# Patient Record
Sex: FEMALE | Race: WHITE | Employment: OTHER | URBAN - METROPOLITAN AREA
[De-identification: names, ages, dates, MRNs, and addresses within clinical notes are randomized per-mention and may not be internally consistent; named-entity substitution may affect disease eponyms.]

---

## 2017-04-04 PROBLEM — Z95.0 PACEMAKER: Status: ACTIVE | Noted: 2017-04-04

## 2017-08-14 PROBLEM — I48.0 PAROXYSMAL ATRIAL FIBRILLATION (HCC): Status: ACTIVE | Noted: 2017-08-14

## 2018-04-13 ENCOUNTER — HOSPITAL ENCOUNTER (EMERGENCY)
Age: 78
Discharge: HOME | End: 2018-04-13
Attending: EMERGENCY MEDICINE
Payer: MEDICARE

## 2018-04-13 VITALS
OXYGEN SATURATION: 99 % | RESPIRATION RATE: 16 BRPM | TEMPERATURE: 98.9 F | DIASTOLIC BLOOD PRESSURE: 80 MMHG | HEART RATE: 77 BPM | SYSTOLIC BLOOD PRESSURE: 133 MMHG

## 2018-04-13 DIAGNOSIS — N39.0 URINARY TRACT INFECTION ASSOCIATED WITH CYSTOSTOMY CATHETER, INITIAL ENCOUNTER (HCC): Primary | ICD-10-CM

## 2018-04-13 DIAGNOSIS — T83.010A SUPRAPUBIC CATHETER DYSFUNCTION, INITIAL ENCOUNTER (HCC): ICD-10-CM

## 2018-04-13 DIAGNOSIS — T83.510A URINARY TRACT INFECTION ASSOCIATED WITH CYSTOSTOMY CATHETER, INITIAL ENCOUNTER (HCC): Primary | ICD-10-CM

## 2018-04-13 LAB
BACTERIA: ABNORMAL /HPF
BILIRUBIN URINE: NEGATIVE
BLOOD, URINE: ABNORMAL
CLARITY: CLEAR
COLOR: YELLOW
GLUCOSE URINE: NEGATIVE MG/DL
KETONES, URINE: NEGATIVE MG/DL
LEUKOCYTE ESTERASE, URINE: ABNORMAL
NITRITE, URINE: POSITIVE
PH UA: 8 (ref 5–9)
PROTEIN UA: ABNORMAL MG/DL
RBC UA: ABNORMAL /HPF (ref 0–2)
SPECIFIC GRAVITY UA: 1.01 (ref 1–1.03)
UROBILINOGEN, URINE: 0.2 E.U./DL
WBC UA: ABNORMAL /HPF (ref 0–5)

## 2018-04-13 PROCEDURE — 81001 URINALYSIS AUTO W/SCOPE: CPT

## 2018-04-13 PROCEDURE — 99284 EMERGENCY DEPT VISIT MOD MDM: CPT

## 2018-04-13 RX ORDER — CEFDINIR 300 MG/1
300 CAPSULE ORAL 2 TIMES DAILY
Qty: 14 CAPSULE | Refills: 0 | Status: SHIPPED | OUTPATIENT
Start: 2018-04-13 | End: 2018-04-20

## 2018-04-13 ASSESSMENT — ENCOUNTER SYMPTOMS
WHEEZING: 0
RHINORRHEA: 0
COUGH: 0
SINUS PRESSURE: 0
EYE PAIN: 0
TROUBLE SWALLOWING: 0
CONSTIPATION: 0
NAUSEA: 0
SORE THROAT: 0
DIARRHEA: 0
PHOTOPHOBIA: 0
EYE REDNESS: 0
VOMITING: 0
ABDOMINAL PAIN: 0
BACK PAIN: 0
ABDOMINAL DISTENTION: 0
CHEST TIGHTNESS: 0
BLOOD IN STOOL: 0
SHORTNESS OF BREATH: 0

## 2018-04-23 ENCOUNTER — HOSPITAL ENCOUNTER (OUTPATIENT)
Dept: PREADMISSION TESTING | Age: 78
Discharge: HOME | End: 2018-04-23
Payer: MEDICARE

## 2018-04-23 ENCOUNTER — HOSPITAL ENCOUNTER (OUTPATIENT)
Dept: GENERAL RADIOLOGY | Age: 78
Discharge: HOME | End: 2018-04-25
Payer: MEDICARE

## 2018-04-23 ENCOUNTER — TELEPHONE (OUTPATIENT)
Dept: CARDIOLOGY CLINIC | Age: 78
End: 2018-04-23

## 2018-04-23 VITALS
HEART RATE: 63 BPM | TEMPERATURE: 98.1 F | OXYGEN SATURATION: 95 % | DIASTOLIC BLOOD PRESSURE: 60 MMHG | RESPIRATION RATE: 18 BRPM | SYSTOLIC BLOOD PRESSURE: 100 MMHG | WEIGHT: 284.38 LBS | BODY MASS INDEX: 45.7 KG/M2 | HEIGHT: 66 IN

## 2018-04-23 DIAGNOSIS — Z01.818 PRE-OP TESTING: Primary | ICD-10-CM

## 2018-04-23 LAB
ANION GAP SERPL CALCULATED.3IONS-SCNC: 10 MMOL/L (ref 7–16)
BUN BLDV-MCNC: 31 MG/DL (ref 8–23)
CALCIUM SERPL-MCNC: 8.7 MG/DL (ref 8.6–10.2)
CHLORIDE BLD-SCNC: 99 MMOL/L (ref 98–107)
CO2: 29 MMOL/L (ref 22–29)
CREAT SERPL-MCNC: 1.5 MG/DL (ref 0.5–1)
GFR AFRICAN AMERICAN: 41
GFR NON-AFRICAN AMERICAN: 33 ML/MIN/1.73
GLUCOSE BLD-MCNC: 317 MG/DL (ref 74–109)
HCT VFR BLD CALC: 32.5 % (ref 34–48)
HEMOGLOBIN: 10.6 G/DL (ref 11.5–15.5)
MCH RBC QN AUTO: 33.2 PG (ref 26–35)
MCHC RBC AUTO-ENTMCNC: 32.6 % (ref 32–34.5)
MCV RBC AUTO: 101.9 FL (ref 80–99.9)
PDW BLD-RTO: 14.9 FL (ref 11.5–15)
PLATELET # BLD: 187 E9/L (ref 130–450)
PMV BLD AUTO: 10 FL (ref 7–12)
POTASSIUM REFLEX MAGNESIUM: 4.2 MMOL/L (ref 3.5–5)
RBC # BLD: 3.19 E12/L (ref 3.5–5.5)
SODIUM BLD-SCNC: 138 MMOL/L (ref 132–146)
WBC # BLD: 6.1 E9/L (ref 4.5–11.5)

## 2018-04-23 PROCEDURE — 71046 X-RAY EXAM CHEST 2 VIEWS: CPT

## 2018-04-23 PROCEDURE — 80048 BASIC METABOLIC PNL TOTAL CA: CPT

## 2018-04-23 PROCEDURE — 85027 COMPLETE CBC AUTOMATED: CPT

## 2018-04-23 PROCEDURE — 36415 COLL VENOUS BLD VENIPUNCTURE: CPT

## 2018-04-23 PROCEDURE — 2580000003 HC RX 258: Performed by: ANESTHESIOLOGY

## 2018-04-23 PROCEDURE — 6360000002 HC RX W HCPCS: Performed by: ANESTHESIOLOGY

## 2018-04-23 RX ORDER — HEPARIN SODIUM (PORCINE) LOCK FLUSH IV SOLN 100 UNIT/ML 100 UNIT/ML
100 SOLUTION INTRAVENOUS PRN
Status: DISCONTINUED | OUTPATIENT
Start: 2018-04-23 | End: 2018-04-24 | Stop reason: HOSPADM

## 2018-04-23 RX ORDER — SODIUM CHLORIDE 0.9 % (FLUSH) 0.9 %
10 SYRINGE (ML) INJECTION 2 TIMES DAILY
Status: DISCONTINUED | OUTPATIENT
Start: 2018-04-23 | End: 2018-04-24 | Stop reason: HOSPADM

## 2018-04-23 RX ORDER — PANTOPRAZOLE SODIUM 40 MG/1
40 GRANULE, DELAYED RELEASE ORAL
COMMUNITY

## 2018-04-23 RX ORDER — BUDESONIDE AND FORMOTEROL FUMARATE DIHYDRATE 160; 4.5 UG/1; UG/1
2 AEROSOL RESPIRATORY (INHALATION) 2 TIMES DAILY
COMMUNITY

## 2018-04-23 RX ADMIN — HEPARIN 500 UNITS: 100 SYRINGE at 12:21

## 2018-04-23 RX ADMIN — Medication 10 ML: at 12:21

## 2018-04-23 ASSESSMENT — PAIN SCALES - GENERAL: PAINLEVEL_OUTOF10: 6

## 2018-04-23 ASSESSMENT — PAIN DESCRIPTION - PAIN TYPE: TYPE: CHRONIC PAIN

## 2018-04-24 ENCOUNTER — HOSPITAL ENCOUNTER (OUTPATIENT)
Dept: GENERAL RADIOLOGY | Age: 78
Discharge: HOME | End: 2018-04-26
Payer: MEDICARE

## 2018-04-24 ENCOUNTER — ANESTHESIA EVENT (OUTPATIENT)
Dept: OPERATING ROOM | Age: 78
End: 2018-04-24
Payer: MEDICARE

## 2018-04-24 ENCOUNTER — HOSPITAL ENCOUNTER (OUTPATIENT)
Age: 78
Setting detail: OUTPATIENT SURGERY
Discharge: HOME | End: 2018-04-24
Attending: UROLOGY | Admitting: UROLOGY
Payer: MEDICARE

## 2018-04-24 ENCOUNTER — ANESTHESIA (OUTPATIENT)
Dept: OPERATING ROOM | Age: 78
End: 2018-04-24
Payer: MEDICARE

## 2018-04-24 VITALS
SYSTOLIC BLOOD PRESSURE: 153 MMHG | RESPIRATION RATE: 14 BRPM | OXYGEN SATURATION: 98 % | DIASTOLIC BLOOD PRESSURE: 68 MMHG

## 2018-04-24 VITALS
HEIGHT: 66 IN | SYSTOLIC BLOOD PRESSURE: 154 MMHG | TEMPERATURE: 97.2 F | OXYGEN SATURATION: 100 % | WEIGHT: 284.38 LBS | DIASTOLIC BLOOD PRESSURE: 72 MMHG | BODY MASS INDEX: 45.7 KG/M2 | HEART RATE: 62 BPM | RESPIRATION RATE: 20 BRPM

## 2018-04-24 DIAGNOSIS — N32.81 OVERACTIVE BLADDER: ICD-10-CM

## 2018-04-24 DIAGNOSIS — N39.0 URINARY TRACT INFECTION WITHOUT HEMATURIA, SITE UNSPECIFIED: ICD-10-CM

## 2018-04-24 LAB — METER GLUCOSE: 187 MG/DL (ref 70–110)

## 2018-04-24 PROCEDURE — 6360000002 HC RX W HCPCS: Performed by: NURSE ANESTHETIST, CERTIFIED REGISTERED

## 2018-04-24 PROCEDURE — 7100000011 HC PHASE II RECOVERY - ADDTL 15 MIN: Performed by: UROLOGY

## 2018-04-24 PROCEDURE — 6360000002 HC RX W HCPCS: Performed by: ANESTHESIOLOGY

## 2018-04-24 PROCEDURE — 2580000003 HC RX 258: Performed by: UROLOGY

## 2018-04-24 PROCEDURE — 7100000010 HC PHASE II RECOVERY - FIRST 15 MIN: Performed by: UROLOGY

## 2018-04-24 PROCEDURE — 82962 GLUCOSE BLOOD TEST: CPT

## 2018-04-24 PROCEDURE — 3700000000 HC ANESTHESIA ATTENDED CARE: Performed by: UROLOGY

## 2018-04-24 PROCEDURE — 74241 FL UGI W KUB: CPT

## 2018-04-24 PROCEDURE — 6360000002 HC RX W HCPCS: Performed by: UROLOGY

## 2018-04-24 PROCEDURE — 3600000002 HC SURGERY LEVEL 2 BASE: Performed by: UROLOGY

## 2018-04-24 PROCEDURE — 3700000001 HC ADD 15 MINUTES (ANESTHESIA): Performed by: UROLOGY

## 2018-04-24 PROCEDURE — 3600000012 HC SURGERY LEVEL 2 ADDTL 15MIN: Performed by: UROLOGY

## 2018-04-24 RX ORDER — FENTANYL CITRATE 50 UG/ML
INJECTION, SOLUTION INTRAMUSCULAR; INTRAVENOUS PRN
Status: DISCONTINUED | OUTPATIENT
Start: 2018-04-24 | End: 2018-04-24 | Stop reason: SDUPTHER

## 2018-04-24 RX ORDER — SODIUM CHLORIDE 0.9 % (FLUSH) 0.9 %
10 SYRINGE (ML) INJECTION EVERY 12 HOURS SCHEDULED
Status: DISCONTINUED | OUTPATIENT
Start: 2018-04-24 | End: 2018-04-24 | Stop reason: HOSPADM

## 2018-04-24 RX ORDER — SODIUM CHLORIDE 0.9 % (FLUSH) 0.9 %
10 SYRINGE (ML) INJECTION PRN
Status: DISCONTINUED | OUTPATIENT
Start: 2018-04-24 | End: 2018-04-24 | Stop reason: HOSPADM

## 2018-04-24 RX ORDER — ACETAMINOPHEN 325 MG/1
650 TABLET ORAL EVERY 6 HOURS PRN
Status: DISCONTINUED | OUTPATIENT
Start: 2018-04-24 | End: 2018-04-24 | Stop reason: HOSPADM

## 2018-04-24 RX ORDER — SODIUM CHLORIDE 9 MG/ML
INJECTION, SOLUTION INTRAVENOUS CONTINUOUS
Status: DISCONTINUED | OUTPATIENT
Start: 2018-04-24 | End: 2018-04-24 | Stop reason: HOSPADM

## 2018-04-24 RX ORDER — CIPROFLOXACIN 250 MG/1
250 TABLET, FILM COATED ORAL 2 TIMES DAILY
Qty: 6 TABLET | Refills: 0 | Status: SHIPPED | OUTPATIENT
Start: 2018-04-24 | End: 2018-04-27

## 2018-04-24 RX ORDER — ONDANSETRON 2 MG/ML
4 INJECTION INTRAMUSCULAR; INTRAVENOUS EVERY 6 HOURS PRN
Status: DISCONTINUED | OUTPATIENT
Start: 2018-04-24 | End: 2018-04-24 | Stop reason: HOSPADM

## 2018-04-24 RX ORDER — PROPOFOL 10 MG/ML
INJECTION, EMULSION INTRAVENOUS CONTINUOUS PRN
Status: DISCONTINUED | OUTPATIENT
Start: 2018-04-24 | End: 2018-04-24 | Stop reason: SDUPTHER

## 2018-04-24 RX ORDER — MEPERIDINE HYDROCHLORIDE 25 MG/ML
12.5 INJECTION INTRAMUSCULAR; INTRAVENOUS; SUBCUTANEOUS EVERY 5 MIN PRN
Status: DISCONTINUED | OUTPATIENT
Start: 2018-04-24 | End: 2018-04-24 | Stop reason: HOSPADM

## 2018-04-24 RX ORDER — CIPROFLOXACIN 2 MG/ML
400 INJECTION, SOLUTION INTRAVENOUS
Status: COMPLETED | OUTPATIENT
Start: 2018-04-24 | End: 2018-04-24

## 2018-04-24 RX ORDER — HEPARIN SODIUM (PORCINE) LOCK FLUSH IV SOLN 100 UNIT/ML 100 UNIT/ML
100 SOLUTION INTRAVENOUS PRN
Status: DISCONTINUED | OUTPATIENT
Start: 2018-04-24 | End: 2018-04-24 | Stop reason: HOSPADM

## 2018-04-24 RX ADMIN — PROPOFOL 50 MCG/KG/MIN: 10 INJECTION, EMULSION INTRAVENOUS at 09:28

## 2018-04-24 RX ADMIN — Medication 10 ML: at 08:41

## 2018-04-24 RX ADMIN — CIPROFLOXACIN 400 MG: 2 INJECTION, SOLUTION INTRAVENOUS at 09:22

## 2018-04-24 RX ADMIN — SODIUM CHLORIDE: 9 INJECTION, SOLUTION INTRAVENOUS at 08:41

## 2018-04-24 RX ADMIN — SODIUM CHLORIDE: 9 INJECTION, SOLUTION INTRAVENOUS at 09:22

## 2018-04-24 RX ADMIN — HEPARIN 500 UNITS: 100 SYRINGE at 11:29

## 2018-04-24 RX ADMIN — FENTANYL CITRATE 25 MCG: 50 INJECTION, SOLUTION INTRAMUSCULAR; INTRAVENOUS at 09:46

## 2018-04-24 RX ADMIN — FENTANYL CITRATE 25 MCG: 50 INJECTION, SOLUTION INTRAMUSCULAR; INTRAVENOUS at 09:54

## 2018-04-24 RX ADMIN — FENTANYL CITRATE 25 MCG: 50 INJECTION, SOLUTION INTRAMUSCULAR; INTRAVENOUS at 09:42

## 2018-04-24 RX ADMIN — FENTANYL CITRATE 25 MCG: 50 INJECTION, SOLUTION INTRAMUSCULAR; INTRAVENOUS at 09:28

## 2018-04-24 RX ADMIN — Medication 10 ML: at 11:26

## 2018-04-24 ASSESSMENT — PULMONARY FUNCTION TESTS
PIF_VALUE: 1
PIF_VALUE: 0
PIF_VALUE: 1
PIF_VALUE: 0
PIF_VALUE: 0
PIF_VALUE: 1
PIF_VALUE: 0
PIF_VALUE: 1

## 2018-04-24 ASSESSMENT — PAIN DESCRIPTION - PAIN TYPE: TYPE: ACUTE PAIN

## 2018-04-24 ASSESSMENT — PAIN SCALES - GENERAL
PAINLEVEL_OUTOF10: 0
PAINLEVEL_OUTOF10: 0

## 2018-04-24 ASSESSMENT — PAIN - FUNCTIONAL ASSESSMENT: PAIN_FUNCTIONAL_ASSESSMENT: 0-10

## 2018-04-24 ASSESSMENT — PAIN DESCRIPTION - DESCRIPTORS: DESCRIPTORS: ACHING

## 2018-07-09 ENCOUNTER — HOSPITAL ENCOUNTER (OUTPATIENT)
Dept: SLEEP CENTER | Age: 78
Discharge: HOME | End: 2018-07-09
Payer: MEDICARE

## 2018-07-31 ENCOUNTER — TELEPHONE (OUTPATIENT)
Dept: CARDIOLOGY CLINIC | Age: 78
End: 2018-07-31

## 2018-07-31 NOTE — TELEPHONE ENCOUNTER
Called patient to schedule 6 month appointment off of August Recall. Her Daughter informed me that she is seeing Dr. Nataliia Metz now.

## 2018-08-25 ENCOUNTER — HOSPITAL ENCOUNTER (OUTPATIENT)
Dept: SLEEP CENTER | Age: 78
Discharge: HOME | End: 2018-08-25
Payer: MEDICARE

## 2018-08-25 PROCEDURE — 95811 POLYSOM 6/>YRS CPAP 4/> PARM: CPT

## 2018-08-30 ENCOUNTER — HOSPITAL ENCOUNTER (INPATIENT)
Age: 78
LOS: 2 days | Discharge: HOME | DRG: 064 | End: 2018-09-01
Attending: INTERNAL MEDICINE | Admitting: INTERNAL MEDICINE
Payer: MEDICARE

## 2018-08-30 ENCOUNTER — APPOINTMENT (OUTPATIENT)
Dept: CT IMAGING | Age: 78
DRG: 064 | End: 2018-08-30
Attending: INTERNAL MEDICINE
Payer: MEDICARE

## 2018-08-30 PROBLEM — Z91.89 STROKE RISK: Status: ACTIVE | Noted: 2018-08-30

## 2018-08-30 PROBLEM — R29.898 LUE WEAKNESS: Status: ACTIVE | Noted: 2018-08-30

## 2018-08-30 PROBLEM — I63.9 ACUTE CEREBROVASCULAR ACCIDENT (CVA) (HCC): Status: ACTIVE | Noted: 2018-08-30

## 2018-08-30 LAB
ANION GAP SERPL CALCULATED.3IONS-SCNC: 12 MMOL/L (ref 7–16)
B.E.: 5 MMOL/L (ref -3–0)
BASOPHILS ABSOLUTE: 0.05 E9/L (ref 0–0.2)
BASOPHILS RELATIVE PERCENT: 0.5 % (ref 0–2)
BUN BLDV-MCNC: 52 MG/DL (ref 8–23)
CALCIUM SERPL-MCNC: 8.8 MG/DL (ref 8.6–10.2)
CARDIOPULMONARY BYPASS: NO
CHLORIDE BLD-SCNC: 102 MMOL/L (ref 98–107)
CO2: 29 MMOL/L (ref 22–29)
CREAT SERPL-MCNC: 2.1 MG/DL (ref 0.5–1)
DELIVERY SYSTEMS: ABNORMAL
DEVICE: ABNORMAL
EKG ATRIAL RATE: 468 BPM
EKG P-R INTERVAL: 338 MS
EKG Q-T INTERVAL: 352 MS
EKG QRS DURATION: 90 MS
EKG QTC CALCULATION (BAZETT): 352 MS
EKG R AXIS: -2 DEGREES
EKG T AXIS: -12 DEGREES
EKG VENTRICULAR RATE: 60 BPM
EOSINOPHILS ABSOLUTE: 0.09 E9/L (ref 0.05–0.5)
EOSINOPHILS RELATIVE PERCENT: 0.9 % (ref 0–6)
FIO2 ARTERIAL: 6
GFR AFRICAN AMERICAN: 27
GFR NON-AFRICAN AMERICAN: 23 ML/MIN/1.73
GLUCOSE BLD-MCNC: 178 MG/DL (ref 74–109)
HCO3 ARTERIAL: 30.8 MMOL/L (ref 22–26)
HCT VFR BLD CALC: 30.3 % (ref 34–48)
HEMOGLOBIN: 9.7 G/DL (ref 11.5–15.5)
IMMATURE GRANULOCYTES #: 0.05 E9/L
IMMATURE GRANULOCYTES %: 0.5 % (ref 0–5)
LYMPHOCYTES ABSOLUTE: 0.65 E9/L (ref 1.5–4)
LYMPHOCYTES RELATIVE PERCENT: 6.5 % (ref 20–42)
MCH RBC QN AUTO: 32.3 PG (ref 26–35)
MCHC RBC AUTO-ENTMCNC: 32 % (ref 32–34.5)
MCV RBC AUTO: 101 FL (ref 80–99.9)
METER GLUCOSE: 180 MG/DL (ref 70–110)
MONOCYTES ABSOLUTE: 0.52 E9/L (ref 0.1–0.95)
MONOCYTES RELATIVE PERCENT: 5.2 % (ref 2–12)
NEUTROPHILS ABSOLUTE: 8.66 E9/L (ref 1.8–7.3)
NEUTROPHILS RELATIVE PERCENT: 86.4 % (ref 43–80)
O2 SATURATION: 96.8 % (ref 92–98.5)
OPERATOR ID: 1394
PATIENT TEMP: 37
PCO2 (TEMP CORRECTED): 50.2 MMHG (ref 35–45)
PDW BLD-RTO: 14.6 FL (ref 11.5–15)
PH (TEMPERATURE CORRECTED): 7.4 (ref 7.35–7.45)
PLATELET # BLD: 164 E9/L (ref 130–450)
PMV BLD AUTO: 10.3 FL (ref 7–12)
PO2 (TEMP CORRECTED): 90.9 MMHG (ref 60–80)
POTASSIUM SERPL-SCNC: 3 MMOL/L (ref 3.5–5)
RBC # BLD: 3 E12/L (ref 3.5–5.5)
SODIUM BLD-SCNC: 143 MMOL/L (ref 132–146)
SOURCE, BLOOD GAS: ABNORMAL
WBC # BLD: 10 E9/L (ref 4.5–11.5)

## 2018-08-30 PROCEDURE — 2580000003 HC RX 258

## 2018-08-30 PROCEDURE — 82803 BLOOD GASES ANY COMBINATION: CPT

## 2018-08-30 PROCEDURE — 80048 BASIC METABOLIC PNL TOTAL CA: CPT

## 2018-08-30 PROCEDURE — 87081 CULTURE SCREEN ONLY: CPT

## 2018-08-30 PROCEDURE — 36415 COLL VENOUS BLD VENIPUNCTURE: CPT

## 2018-08-30 PROCEDURE — 85025 COMPLETE CBC W/AUTO DIFF WBC: CPT

## 2018-08-30 PROCEDURE — 70450 CT HEAD/BRAIN W/O DYE: CPT

## 2018-08-30 PROCEDURE — 2000000000 HC ICU R&B

## 2018-08-30 PROCEDURE — 82962 GLUCOSE BLOOD TEST: CPT

## 2018-08-30 PROCEDURE — 93005 ELECTROCARDIOGRAM TRACING: CPT | Performed by: INTERNAL MEDICINE

## 2018-08-30 PROCEDURE — 2580000003 HC RX 258: Performed by: HOSPITALIST

## 2018-08-30 PROCEDURE — 94660 CPAP INITIATION&MGMT: CPT

## 2018-08-30 RX ORDER — FUROSEMIDE 40 MG/1
40 TABLET ORAL 2 TIMES DAILY
Status: DISCONTINUED | OUTPATIENT
Start: 2018-08-30 | End: 2018-08-30

## 2018-08-30 RX ORDER — ARIPIPRAZOLE 5 MG/1
5 TABLET ORAL DAILY
Status: DISCONTINUED | OUTPATIENT
Start: 2018-08-31 | End: 2018-09-01 | Stop reason: HOSPADM

## 2018-08-30 RX ORDER — IPRATROPIUM BROMIDE AND ALBUTEROL SULFATE 2.5; .5 MG/3ML; MG/3ML
1 SOLUTION RESPIRATORY (INHALATION)
Status: DISCONTINUED | OUTPATIENT
Start: 2018-08-31 | End: 2018-09-01 | Stop reason: HOSPADM

## 2018-08-30 RX ORDER — DEXTROSE MONOHYDRATE 50 MG/ML
100 INJECTION, SOLUTION INTRAVENOUS PRN
Status: DISCONTINUED | OUTPATIENT
Start: 2018-08-30 | End: 2018-09-01 | Stop reason: HOSPADM

## 2018-08-30 RX ORDER — HYDROCODONE BITARTRATE AND ACETAMINOPHEN 7.5; 325 MG/1; MG/1
1 TABLET ORAL EVERY 6 HOURS PRN
Status: DISCONTINUED | OUTPATIENT
Start: 2018-08-30 | End: 2018-08-30 | Stop reason: SDUPTHER

## 2018-08-30 RX ORDER — FOLIC ACID 1 MG/1
1 TABLET ORAL DAILY
Status: DISCONTINUED | OUTPATIENT
Start: 2018-08-31 | End: 2018-09-01 | Stop reason: HOSPADM

## 2018-08-30 RX ORDER — POTASSIUM CHLORIDE 20 MEQ/1
40 TABLET, EXTENDED RELEASE ORAL ONCE
Status: COMPLETED | OUTPATIENT
Start: 2018-08-30 | End: 2018-08-31

## 2018-08-30 RX ORDER — TRAMADOL HYDROCHLORIDE 50 MG/1
50 TABLET ORAL EVERY 6 HOURS PRN
Status: DISCONTINUED | OUTPATIENT
Start: 2018-08-30 | End: 2018-09-01 | Stop reason: HOSPADM

## 2018-08-30 RX ORDER — BUSPIRONE HYDROCHLORIDE 10 MG/1
10 TABLET ORAL 2 TIMES DAILY
Status: DISCONTINUED | OUTPATIENT
Start: 2018-08-30 | End: 2018-09-01 | Stop reason: HOSPADM

## 2018-08-30 RX ORDER — PANTOPRAZOLE SODIUM 40 MG/1
40 TABLET, DELAYED RELEASE ORAL
Status: DISCONTINUED | OUTPATIENT
Start: 2018-08-31 | End: 2018-09-01 | Stop reason: HOSPADM

## 2018-08-30 RX ORDER — ATORVASTATIN CALCIUM 40 MG/1
40 TABLET, FILM COATED ORAL NIGHTLY
Status: DISCONTINUED | OUTPATIENT
Start: 2018-08-30 | End: 2018-09-01 | Stop reason: HOSPADM

## 2018-08-30 RX ORDER — ALLOPURINOL 100 MG/1
100 TABLET ORAL DAILY
Status: DISCONTINUED | OUTPATIENT
Start: 2018-08-31 | End: 2018-09-01 | Stop reason: HOSPADM

## 2018-08-30 RX ORDER — FERROUS SULFATE 325(65) MG
325 TABLET ORAL
Status: DISCONTINUED | OUTPATIENT
Start: 2018-08-31 | End: 2018-09-01 | Stop reason: HOSPADM

## 2018-08-30 RX ORDER — LOSARTAN POTASSIUM 25 MG/1
25 TABLET ORAL DAILY
Status: DISCONTINUED | OUTPATIENT
Start: 2018-08-31 | End: 2018-09-01 | Stop reason: HOSPADM

## 2018-08-30 RX ORDER — DESVENLAFAXINE 50 MG/1
100 TABLET, EXTENDED RELEASE ORAL DAILY
Status: DISCONTINUED | OUTPATIENT
Start: 2018-08-31 | End: 2018-08-30 | Stop reason: SDUPTHER

## 2018-08-30 RX ORDER — SODIUM CHLORIDE 0.9 % (FLUSH) 0.9 %
10 SYRINGE (ML) INJECTION PRN
Status: DISCONTINUED | OUTPATIENT
Start: 2018-08-30 | End: 2018-09-01 | Stop reason: HOSPADM

## 2018-08-30 RX ORDER — HYDROCODONE BITARTRATE AND ACETAMINOPHEN 7.5; 325 MG/1; MG/1
1 TABLET ORAL EVERY 6 HOURS PRN
Status: DISCONTINUED | OUTPATIENT
Start: 2018-08-30 | End: 2018-09-01 | Stop reason: HOSPADM

## 2018-08-30 RX ORDER — SODIUM CHLORIDE 0.9 % (FLUSH) 0.9 %
10 SYRINGE (ML) INJECTION EVERY 12 HOURS SCHEDULED
Status: DISCONTINUED | OUTPATIENT
Start: 2018-08-30 | End: 2018-09-01 | Stop reason: HOSPADM

## 2018-08-30 RX ORDER — PRIMIDONE 50 MG/1
125 TABLET ORAL 2 TIMES DAILY
Status: DISCONTINUED | OUTPATIENT
Start: 2018-08-31 | End: 2018-08-30

## 2018-08-30 RX ORDER — SPIRONOLACTONE 25 MG/1
12.5 TABLET ORAL DAILY
Status: DISCONTINUED | OUTPATIENT
Start: 2018-08-31 | End: 2018-08-30

## 2018-08-30 RX ORDER — POTASSIUM CHLORIDE 750 MG/1
60 CAPSULE, EXTENDED RELEASE ORAL ONCE
Status: DISCONTINUED | OUTPATIENT
Start: 2018-08-30 | End: 2018-08-30

## 2018-08-30 RX ORDER — TOPIRAMATE 25 MG/1
25 TABLET ORAL 2 TIMES DAILY
Status: DISCONTINUED | OUTPATIENT
Start: 2018-08-30 | End: 2018-09-01 | Stop reason: HOSPADM

## 2018-08-30 RX ORDER — ONDANSETRON 2 MG/ML
4 INJECTION INTRAMUSCULAR; INTRAVENOUS EVERY 6 HOURS PRN
Status: DISCONTINUED | OUTPATIENT
Start: 2018-08-30 | End: 2018-09-01 | Stop reason: HOSPADM

## 2018-08-30 RX ORDER — NICOTINE POLACRILEX 4 MG
15 LOZENGE BUCCAL PRN
Status: DISCONTINUED | OUTPATIENT
Start: 2018-08-30 | End: 2018-09-01 | Stop reason: HOSPADM

## 2018-08-30 RX ORDER — DEXTROSE MONOHYDRATE 25 G/50ML
12.5 INJECTION, SOLUTION INTRAVENOUS PRN
Status: DISCONTINUED | OUTPATIENT
Start: 2018-08-30 | End: 2018-09-01 | Stop reason: HOSPADM

## 2018-08-30 RX ORDER — METOPROLOL SUCCINATE 25 MG/1
25 TABLET, EXTENDED RELEASE ORAL 2 TIMES DAILY
Status: DISCONTINUED | OUTPATIENT
Start: 2018-08-31 | End: 2018-09-01 | Stop reason: HOSPADM

## 2018-08-30 RX ORDER — TIZANIDINE 4 MG/1
4 TABLET ORAL EVERY 6 HOURS PRN
Status: DISCONTINUED | OUTPATIENT
Start: 2018-08-30 | End: 2018-09-01 | Stop reason: HOSPADM

## 2018-08-30 RX ORDER — ISOSORBIDE MONONITRATE 30 MG/1
30 TABLET, EXTENDED RELEASE ORAL DAILY
Status: DISCONTINUED | OUTPATIENT
Start: 2018-08-31 | End: 2018-09-01 | Stop reason: HOSPADM

## 2018-08-30 RX ADMIN — DEXTROSE MONOHYDRATE 12.5 G: 25 INJECTION, SOLUTION INTRAVENOUS at 23:56

## 2018-08-30 ASSESSMENT — PAIN SCALES - GENERAL: PAINLEVEL_OUTOF10: 0

## 2018-08-31 ENCOUNTER — APPOINTMENT (OUTPATIENT)
Dept: ULTRASOUND IMAGING | Age: 78
DRG: 064 | End: 2018-08-31
Attending: INTERNAL MEDICINE
Payer: MEDICARE

## 2018-08-31 LAB
ANION GAP SERPL CALCULATED.3IONS-SCNC: 15 MMOL/L (ref 7–16)
BUN BLDV-MCNC: 49 MG/DL (ref 8–23)
CALCIUM SERPL-MCNC: 8.7 MG/DL (ref 8.6–10.2)
CHLORIDE BLD-SCNC: 101 MMOL/L (ref 98–107)
CO2: 29 MMOL/L (ref 22–29)
CREAT SERPL-MCNC: 2 MG/DL (ref 0.5–1)
GFR AFRICAN AMERICAN: 29
GFR NON-AFRICAN AMERICAN: 24 ML/MIN/1.73
GLUCOSE BLD-MCNC: 112 MG/DL (ref 74–109)
HCT VFR BLD CALC: 30.7 % (ref 34–48)
HEMOGLOBIN: 9.7 G/DL (ref 11.5–15.5)
MAGNESIUM: 2.5 MG/DL (ref 1.6–2.6)
MCH RBC QN AUTO: 32.6 PG (ref 26–35)
MCHC RBC AUTO-ENTMCNC: 31.6 % (ref 32–34.5)
MCV RBC AUTO: 103 FL (ref 80–99.9)
METER GLUCOSE: 177 MG/DL (ref 70–110)
METER GLUCOSE: 196 MG/DL (ref 70–110)
METER GLUCOSE: 68 MG/DL (ref 70–110)
METER GLUCOSE: 88 MG/DL (ref 70–110)
METER GLUCOSE: 97 MG/DL (ref 70–110)
METER GLUCOSE: 99 MG/DL (ref 70–110)
PDW BLD-RTO: 14.9 FL (ref 11.5–15)
PLATELET # BLD: 169 E9/L (ref 130–450)
PMV BLD AUTO: 10.3 FL (ref 7–12)
POTASSIUM REFLEX MAGNESIUM: 4 MMOL/L (ref 3.5–5)
RBC # BLD: 2.98 E12/L (ref 3.5–5.5)
SODIUM BLD-SCNC: 145 MMOL/L (ref 132–146)
WBC # BLD: 8.2 E9/L (ref 4.5–11.5)

## 2018-08-31 PROCEDURE — 97530 THERAPEUTIC ACTIVITIES: CPT

## 2018-08-31 PROCEDURE — 80048 BASIC METABOLIC PNL TOTAL CA: CPT

## 2018-08-31 PROCEDURE — 6370000000 HC RX 637 (ALT 250 FOR IP): Performed by: HOSPITALIST

## 2018-08-31 PROCEDURE — 2580000003 HC RX 258: Performed by: HOSPITALIST

## 2018-08-31 PROCEDURE — 6360000002 HC RX W HCPCS: Performed by: INTERNAL MEDICINE

## 2018-08-31 PROCEDURE — 94660 CPAP INITIATION&MGMT: CPT

## 2018-08-31 PROCEDURE — 97162 PT EVAL MOD COMPLEX 30 MIN: CPT

## 2018-08-31 PROCEDURE — 82962 GLUCOSE BLOOD TEST: CPT

## 2018-08-31 PROCEDURE — 99221 1ST HOSP IP/OBS SF/LOW 40: CPT | Performed by: PSYCHIATRY & NEUROLOGY

## 2018-08-31 PROCEDURE — 85027 COMPLETE CBC AUTOMATED: CPT

## 2018-08-31 PROCEDURE — 94640 AIRWAY INHALATION TREATMENT: CPT

## 2018-08-31 PROCEDURE — 94664 DEMO&/EVAL PT USE INHALER: CPT

## 2018-08-31 PROCEDURE — 92523 SPEECH SOUND LANG COMPREHEN: CPT

## 2018-08-31 PROCEDURE — APPSS30 APP SPLIT SHARED TIME 16-30 MINUTES: Performed by: NURSE PRACTITIONER

## 2018-08-31 PROCEDURE — 2060000000 HC ICU INTERMEDIATE R&B

## 2018-08-31 PROCEDURE — G8981 BODY POS CURRENT STATUS: HCPCS

## 2018-08-31 PROCEDURE — 93010 ELECTROCARDIOGRAM REPORT: CPT | Performed by: INTERNAL MEDICINE

## 2018-08-31 PROCEDURE — 97166 OT EVAL MOD COMPLEX 45 MIN: CPT

## 2018-08-31 PROCEDURE — 83735 ASSAY OF MAGNESIUM: CPT

## 2018-08-31 PROCEDURE — 6370000000 HC RX 637 (ALT 250 FOR IP): Performed by: INTERNAL MEDICINE

## 2018-08-31 PROCEDURE — 93880 EXTRACRANIAL BILAT STUDY: CPT

## 2018-08-31 PROCEDURE — 6370000000 HC RX 637 (ALT 250 FOR IP): Performed by: PSYCHIATRY & NEUROLOGY

## 2018-08-31 PROCEDURE — G8982 BODY POS GOAL STATUS: HCPCS

## 2018-08-31 PROCEDURE — 2700000000 HC OXYGEN THERAPY PER DAY

## 2018-08-31 PROCEDURE — 36415 COLL VENOUS BLD VENIPUNCTURE: CPT

## 2018-08-31 RX ORDER — INSULIN GLARGINE 100 [IU]/ML
75 INJECTION, SOLUTION SUBCUTANEOUS DAILY
Status: DISCONTINUED | OUTPATIENT
Start: 2018-09-01 | End: 2018-09-01 | Stop reason: HOSPADM

## 2018-08-31 RX ORDER — DEXTROSE AND SODIUM CHLORIDE 5; .9 G/100ML; G/100ML
INJECTION, SOLUTION INTRAVENOUS CONTINUOUS
Status: DISCONTINUED | OUTPATIENT
Start: 2018-08-31 | End: 2018-08-31

## 2018-08-31 RX ORDER — INSULIN GLARGINE 100 [IU]/ML
35 INJECTION, SOLUTION SUBCUTANEOUS NIGHTLY
Status: DISCONTINUED | OUTPATIENT
Start: 2018-08-31 | End: 2018-09-01 | Stop reason: HOSPADM

## 2018-08-31 RX ORDER — POTASSIUM CHLORIDE 20 MEQ/1
TABLET, EXTENDED RELEASE ORAL
Status: DISPENSED
Start: 2018-08-31 | End: 2018-08-31

## 2018-08-31 RX ORDER — NITROGLYCERIN 0.4 MG/1
0.4 TABLET SUBLINGUAL EVERY 5 MIN PRN
Status: DISCONTINUED | OUTPATIENT
Start: 2018-08-31 | End: 2018-09-01 | Stop reason: HOSPADM

## 2018-08-31 RX ORDER — CLOPIDOGREL BISULFATE 75 MG/1
75 TABLET ORAL DAILY
Status: DISCONTINUED | OUTPATIENT
Start: 2018-08-31 | End: 2018-09-01 | Stop reason: HOSPADM

## 2018-08-31 RX ORDER — LEVOTHYROXINE SODIUM 0.15 MG/1
150 TABLET ORAL
Status: DISCONTINUED | OUTPATIENT
Start: 2018-09-01 | End: 2018-09-01 | Stop reason: HOSPADM

## 2018-08-31 RX ORDER — LANOLIN ALCOHOL/MO/W.PET/CERES
400 CREAM (GRAM) TOPICAL DAILY
Status: DISCONTINUED | OUTPATIENT
Start: 2018-09-01 | End: 2018-09-01 | Stop reason: HOSPADM

## 2018-08-31 RX ORDER — LIDOCAINE 50 MG/G
OINTMENT TOPICAL 2 TIMES DAILY
Status: DISCONTINUED | OUTPATIENT
Start: 2018-08-31 | End: 2018-09-01 | Stop reason: HOSPADM

## 2018-08-31 RX ADMIN — BUSPIRONE HYDROCHLORIDE 10 MG: 10 TABLET ORAL at 21:37

## 2018-08-31 RX ADMIN — ARIPIPRAZOLE 5 MG: 5 TABLET ORAL at 10:45

## 2018-08-31 RX ADMIN — PANTOPRAZOLE SODIUM 40 MG: 40 TABLET, DELAYED RELEASE ORAL at 10:46

## 2018-08-31 RX ADMIN — FERROUS SULFATE TAB 325 MG (65 MG ELEMENTAL FE) 325 MG: 325 (65 FE) TAB at 10:46

## 2018-08-31 RX ADMIN — LIDOCAINE: 50 OINTMENT TOPICAL at 21:36

## 2018-08-31 RX ADMIN — CLOPIDOGREL 75 MG: 75 TABLET, FILM COATED ORAL at 13:50

## 2018-08-31 RX ADMIN — FOLIC ACID 1 MG: 1 TABLET ORAL at 10:45

## 2018-08-31 RX ADMIN — IPRATROPIUM BROMIDE AND ALBUTEROL SULFATE 1 AMPULE: .5; 3 SOLUTION RESPIRATORY (INHALATION) at 20:59

## 2018-08-31 RX ADMIN — INSULIN LISPRO 2 UNITS: 100 INJECTION, SOLUTION INTRAVENOUS; SUBCUTANEOUS at 21:42

## 2018-08-31 RX ADMIN — APIXABAN 5 MG: 5 TABLET, FILM COATED ORAL at 21:37

## 2018-08-31 RX ADMIN — INSULIN GLARGINE 35 UNITS: 100 INJECTION, SOLUTION SUBCUTANEOUS at 21:42

## 2018-08-31 RX ADMIN — DEXTROSE AND SODIUM CHLORIDE: 5; 900 INJECTION, SOLUTION INTRAVENOUS at 01:53

## 2018-08-31 RX ADMIN — LOSARTAN POTASSIUM 25 MG: 25 TABLET, FILM COATED ORAL at 10:47

## 2018-08-31 RX ADMIN — Medication 10 ML: at 21:37

## 2018-08-31 RX ADMIN — ISOSORBIDE MONONITRATE 30 MG: 30 TABLET ORAL at 10:44

## 2018-08-31 RX ADMIN — METOPROLOL SUCCINATE 25 MG: 25 TABLET, FILM COATED, EXTENDED RELEASE ORAL at 10:46

## 2018-08-31 RX ADMIN — IPRATROPIUM BROMIDE AND ALBUTEROL SULFATE 1 AMPULE: .5; 3 SOLUTION RESPIRATORY (INHALATION) at 09:18

## 2018-08-31 RX ADMIN — METOPROLOL SUCCINATE 25 MG: 25 TABLET, FILM COATED, EXTENDED RELEASE ORAL at 18:45

## 2018-08-31 RX ADMIN — TOPIRAMATE 25 MG: 25 TABLET, FILM COATED ORAL at 21:37

## 2018-08-31 RX ADMIN — MOMETASONE FUROATE AND FORMOTEROL FUMARATE DIHYDRATE 2 PUFF: 200; 5 AEROSOL RESPIRATORY (INHALATION) at 10:48

## 2018-08-31 RX ADMIN — POTASSIUM CHLORIDE 40 MEQ: 20 TABLET, EXTENDED RELEASE ORAL at 01:42

## 2018-08-31 RX ADMIN — MAGNESIUM HYDROXIDE 30 ML: 400 SUSPENSION ORAL at 18:44

## 2018-08-31 RX ADMIN — APIXABAN 5 MG: 5 TABLET, FILM COATED ORAL at 10:47

## 2018-08-31 RX ADMIN — ROPINIROLE 3 MG: 2 TABLET ORAL at 21:37

## 2018-08-31 RX ADMIN — TOPIRAMATE 25 MG: 25 TABLET, FILM COATED ORAL at 10:45

## 2018-08-31 RX ADMIN — ALLOPURINOL 100 MG: 100 TABLET ORAL at 10:46

## 2018-08-31 RX ADMIN — INSULIN LISPRO 2 UNITS: 100 INJECTION, SOLUTION INTRAVENOUS; SUBCUTANEOUS at 18:45

## 2018-08-31 RX ADMIN — SERTRALINE 25 MG: 50 TABLET, FILM COATED ORAL at 10:45

## 2018-08-31 RX ADMIN — BUSPIRONE HYDROCHLORIDE 10 MG: 10 TABLET ORAL at 10:46

## 2018-08-31 RX ADMIN — MOMETASONE FUROATE AND FORMOTEROL FUMARATE DIHYDRATE 2 PUFF: 200; 5 AEROSOL RESPIRATORY (INHALATION) at 21:37

## 2018-08-31 RX ADMIN — DESMOPRESSIN ACETATE 40 MG: 0.2 TABLET ORAL at 21:36

## 2018-08-31 RX ADMIN — DARBEPOETIN ALFA 100 MCG: 100 INJECTION, SOLUTION INTRAVENOUS; SUBCUTANEOUS at 21:37

## 2018-08-31 ASSESSMENT — ENCOUNTER SYMPTOMS
SINUS PAIN: 1
SHORTNESS OF BREATH: 1
BLURRED VISION: 1

## 2018-08-31 ASSESSMENT — PAIN SCALES - GENERAL
PAINLEVEL_OUTOF10: 0

## 2018-08-31 NOTE — PLAN OF CARE
Problem: ACTIVITY INTOLERANCE/IMPAIRED MOBILITY  Goal: Mobility/activity is maintained at optimum level for patient  Outcome: Ongoing

## 2018-08-31 NOTE — H&P
with cerebral infarction     Urinary tract infection, site not specified 3/16/2012       Past Surgical History:          Procedure Laterality Date    BACK SURGERY      lumbar fusion 5-7    CARDIAC PACEMAKER PLACEMENT  1/10/09    Dr. Yifan Cabrera  03/28/2017    RETROGRADES, PYLEOGRAM, INSERTION SUPRA PUBIC CATHETER    DIAGNOSTIC CARDIAC CATH LAB PROCEDURE  1983    [address] [place]    DIAGNOSTIC CARDIAC CATH LAB PROCEDURE  5/95    [facility]    DIAGNOSTIC CARDIAC CATH LAB PROCEDURE  2/23/98    [facility], widely patent coronaries. Normal LV    ECHO COMPL W DOP COLOR FLOW  2/4/2012 EF 55%    stage II diastolic    EYE SURGERY      cataracts     FEMUR FRACTURE SURGERY  1/11    femoral fracture (supracondylar) involving left distal femur    FRACTURE SURGERY      left upper leg plate and screws    HYSTERECTOMY      JOINT REPLACEMENT      x2 right and left    JOINT REPLACEMENT  1/07, right    knee    KNEE SURGERY  2002    left knee by Dr. Brumfield Din      fusion 3/4 7/8 /    OTHER SURGICAL HISTORY      mediport 1/24/2012    OTHER SURGICAL HISTORY  2/05/2012    orif rt. distal femur and rt. ankle    PACEMAKER PLACEMENT      LA CYSTOURETHROSCOPY INJ CHEMODENERVATION BLADDER N/A 4/24/2018    CYSTOSCOPY, RETROGRADE, PYELOGRAM BOTOX INJECTION 200 UNITS performed by Brian Laurent MD at [address] [place] [address],[facility] PTCA  10/30/95    PTCA of LAD    PTCA  4/14/04    [facility], PTCA of RCA, Taxus drug eluting stent     SHOULDER SURGERY  OCTOBER 2012    LEFT ACROMIOPLASTY,ROTATOR CUFF REPAIR AND ÁNGELA PROCEDURE    SKIN CANCER EXCISION Right 07/2017    right arm.  TUNNELED VENOUS PORT PLACEMENT Right 08/2017    [facility]    UPPER GASTROINTESTINAL ENDOSCOPY      VENA CAVA FILTER PLACEMENT  11/2017    by Dr Russ Smith due to DVT       Medications Prior to Admission:      Prior to Admission medications    Medication Sig Start Date End Date Taking?  Authorizing Provider obvious deformity. Pupils equal, round, and reactive to light. Extra ocular muscles intact. Conjunctivae/corneas clear. Neck: Supple, with full range of motion. No jugular venous distention. Trachea midline. Respiratory:  Normal respiratory effort. Clear to auscultation, bilaterally without Rales/Wheezes/Rhonchi. Cardiovascular:  Regular rate and rhythm with normal S1/S2 without murmurs, rubs or gallops. Abdomen: Soft, non-tender, non-distended with normal bowel sounds. Musculoskeletal:  No clubbing, cyanosis or edema bilaterally. Full range of motion without deformity. Skin: Skin color, texture, turgor normal.  No rashes or lesions. Neurologic: On bipap, LUE weakness noted 4/5  Psychiatric:  Alert and oriented, thought content appropriate, normal insight  Capillary Refill: Brisk,< 3 seconds   Peripheral Pulses: +2 palpable, equal bilaterally       Labs:     Recent Labs      08/30/18 2040   WBC  10.0   HGB  9.7*   HCT  30.3*   PLT  164     Recent Labs      08/30/18 2040   NA  143   K  3.0*   CL  102   CO2  29   BUN  52*   CREATININE  2.1*   CALCIUM  8.8     No results for input(s): AST, ALT, BILIDIR, BILITOT, ALKPHOS in the last 72 hours. No results for input(s): INR in the last 72 hours. No results for input(s): Katina Juba in the last 72 hours. Urinalysis:      Lab Results   Component Value Date    NITRU POSITIVE 04/13/2018    WBCUA 10-20 04/13/2018    WBCUA SEE BELOW 05/19/2012    BACTERIA MANY 04/13/2018    RBCUA 1-3 04/13/2018    RBCUA 1-3 03/27/2013    BLOODU MODERATE 04/13/2018    SPECGRAV 1.010 04/13/2018    GLUCOSEU Negative 04/13/2018    GLUCOSEU SEE BELOW 05/19/2012       Radiology:         CT Head WO Contrast   Final Result   Limited study due to motion artifacts. Consider repeat   examination.                   MRI BRAIN W WO CONTRAST    (Results Pending)   CTA HEAD W CONTRAST    (Results Pending)   CTA NECK W CONTRAST    (Results Pending)       ASSESSMENT:    SERJIO/Cj [address]

## 2018-09-01 ENCOUNTER — APPOINTMENT (OUTPATIENT)
Dept: CT IMAGING | Age: 78
DRG: 064 | End: 2018-09-01
Attending: INTERNAL MEDICINE
Payer: MEDICARE

## 2018-09-01 VITALS
WEIGHT: 285.6 LBS | HEART RATE: 60 BPM | BODY MASS INDEX: 50.61 KG/M2 | RESPIRATION RATE: 18 BRPM | HEIGHT: 63 IN | SYSTOLIC BLOOD PRESSURE: 127 MMHG | OXYGEN SATURATION: 94 % | TEMPERATURE: 97.9 F | DIASTOLIC BLOOD PRESSURE: 49 MMHG

## 2018-09-01 LAB
ALBUMIN SERPL-MCNC: 3.4 G/DL (ref 3.5–5.2)
ALP BLD-CCNC: 103 U/L (ref 35–104)
ALT SERPL-CCNC: 16 U/L (ref 0–32)
ANION GAP SERPL CALCULATED.3IONS-SCNC: 13 MMOL/L (ref 7–16)
AST SERPL-CCNC: 20 U/L (ref 0–31)
BASOPHILS ABSOLUTE: 0.05 E9/L (ref 0–0.2)
BASOPHILS RELATIVE PERCENT: 0.7 % (ref 0–2)
BILIRUB SERPL-MCNC: 0.3 MG/DL (ref 0–1.2)
BUN BLDV-MCNC: 39 MG/DL (ref 8–23)
CALCIUM SERPL-MCNC: 8.8 MG/DL (ref 8.6–10.2)
CHLORIDE BLD-SCNC: 104 MMOL/L (ref 98–107)
CO2: 26 MMOL/L (ref 22–29)
CREAT SERPL-MCNC: 1.9 MG/DL (ref 0.5–1)
EOSINOPHILS ABSOLUTE: 0.28 E9/L (ref 0.05–0.5)
EOSINOPHILS RELATIVE PERCENT: 4 % (ref 0–6)
FOLATE: >20 NG/ML (ref 4.8–24.2)
GFR AFRICAN AMERICAN: 31
GFR NON-AFRICAN AMERICAN: 25 ML/MIN/1.73
GLUCOSE BLD-MCNC: 105 MG/DL (ref 74–109)
HCT VFR BLD CALC: 29.7 % (ref 34–48)
HEMOGLOBIN: 9.4 G/DL (ref 11.5–15.5)
IMMATURE GRANULOCYTES #: 0.03 E9/L
IMMATURE GRANULOCYTES %: 0.4 % (ref 0–5)
IRON SATURATION: 24 % (ref 15–50)
IRON: 72 MCG/DL (ref 37–145)
LV EF: 65 %
LVEF MODALITY: NORMAL
LYMPHOCYTES ABSOLUTE: 2.16 E9/L (ref 1.5–4)
LYMPHOCYTES RELATIVE PERCENT: 30.7 % (ref 20–42)
MAGNESIUM: 2.7 MG/DL (ref 1.6–2.6)
MCH RBC QN AUTO: 33.5 PG (ref 26–35)
MCHC RBC AUTO-ENTMCNC: 31.6 % (ref 32–34.5)
MCV RBC AUTO: 105.7 FL (ref 80–99.9)
METER GLUCOSE: 106 MG/DL (ref 70–110)
METER GLUCOSE: 114 MG/DL (ref 70–110)
METER GLUCOSE: 124 MG/DL (ref 70–110)
MONOCYTES ABSOLUTE: 0.82 E9/L (ref 0.1–0.95)
MONOCYTES RELATIVE PERCENT: 11.6 % (ref 2–12)
MRSA CULTURE ONLY: NORMAL
NEUTROPHILS ABSOLUTE: 3.7 E9/L (ref 1.8–7.3)
NEUTROPHILS RELATIVE PERCENT: 52.6 % (ref 43–80)
PDW BLD-RTO: 15.2 FL (ref 11.5–15)
PHOSPHORUS: 2.8 MG/DL (ref 2.5–4.5)
PLATELET # BLD: 180 E9/L (ref 130–450)
PMV BLD AUTO: 10.3 FL (ref 7–12)
POTASSIUM SERPL-SCNC: 4.3 MMOL/L (ref 3.5–5)
RBC # BLD: 2.81 E12/L (ref 3.5–5.5)
SODIUM BLD-SCNC: 143 MMOL/L (ref 132–146)
TOTAL IRON BINDING CAPACITY: 297 MCG/DL (ref 250–450)
TOTAL PROTEIN: 6.8 G/DL (ref 6.4–8.3)
VITAMIN B-12: 543 PG/ML (ref 211–946)
WBC # BLD: 7 E9/L (ref 4.5–11.5)

## 2018-09-01 PROCEDURE — 2700000000 HC OXYGEN THERAPY PER DAY

## 2018-09-01 PROCEDURE — 84100 ASSAY OF PHOSPHORUS: CPT

## 2018-09-01 PROCEDURE — 82962 GLUCOSE BLOOD TEST: CPT

## 2018-09-01 PROCEDURE — 6370000000 HC RX 637 (ALT 250 FOR IP): Performed by: HOSPITALIST

## 2018-09-01 PROCEDURE — 6370000000 HC RX 637 (ALT 250 FOR IP): Performed by: INTERNAL MEDICINE

## 2018-09-01 PROCEDURE — 2580000003 HC RX 258: Performed by: HOSPITALIST

## 2018-09-01 PROCEDURE — 6370000000 HC RX 637 (ALT 250 FOR IP): Performed by: PSYCHIATRY & NEUROLOGY

## 2018-09-01 PROCEDURE — 93306 TTE W/DOPPLER COMPLETE: CPT

## 2018-09-01 PROCEDURE — 82746 ASSAY OF FOLIC ACID SERUM: CPT

## 2018-09-01 PROCEDURE — 6360000002 HC RX W HCPCS: Performed by: HOSPITALIST

## 2018-09-01 PROCEDURE — 99232 SBSQ HOSP IP/OBS MODERATE 35: CPT | Performed by: NURSE PRACTITIONER

## 2018-09-01 PROCEDURE — 94660 CPAP INITIATION&MGMT: CPT

## 2018-09-01 PROCEDURE — 85025 COMPLETE CBC W/AUTO DIFF WBC: CPT

## 2018-09-01 PROCEDURE — 82607 VITAMIN B-12: CPT

## 2018-09-01 PROCEDURE — 83735 ASSAY OF MAGNESIUM: CPT

## 2018-09-01 PROCEDURE — 80053 COMPREHEN METABOLIC PANEL: CPT

## 2018-09-01 PROCEDURE — 83550 IRON BINDING TEST: CPT

## 2018-09-01 PROCEDURE — 36415 COLL VENOUS BLD VENIPUNCTURE: CPT

## 2018-09-01 PROCEDURE — 70450 CT HEAD/BRAIN W/O DYE: CPT

## 2018-09-01 PROCEDURE — 83540 ASSAY OF IRON: CPT

## 2018-09-01 RX ORDER — CLOPIDOGREL BISULFATE 75 MG/1
75 TABLET ORAL DAILY
Qty: 30 TABLET | Refills: 0 | Status: SHIPPED | OUTPATIENT
Start: 2018-09-02

## 2018-09-01 RX ORDER — BISACODYL 10 MG
10 SUPPOSITORY, RECTAL RECTAL DAILY PRN
Status: DISCONTINUED | OUTPATIENT
Start: 2018-09-01 | End: 2018-09-01 | Stop reason: HOSPADM

## 2018-09-01 RX ORDER — POLYETHYLENE GLYCOL 3350 17 G/17G
17 POWDER, FOR SOLUTION ORAL DAILY
Status: DISCONTINUED | OUTPATIENT
Start: 2018-09-01 | End: 2018-09-01 | Stop reason: HOSPADM

## 2018-09-01 RX ADMIN — Medication 10 ML: at 09:06

## 2018-09-01 RX ADMIN — POLYETHYLENE GLYCOL 3350 17 G: 17 POWDER, FOR SOLUTION ORAL at 09:50

## 2018-09-01 RX ADMIN — ONDANSETRON 4 MG: 2 INJECTION INTRAMUSCULAR; INTRAVENOUS at 16:59

## 2018-09-01 RX ADMIN — Medication 400 MG: at 09:26

## 2018-09-01 RX ADMIN — ONDANSETRON 4 MG: 2 INJECTION INTRAMUSCULAR; INTRAVENOUS at 09:05

## 2018-09-01 RX ADMIN — APIXABAN 5 MG: 5 TABLET, FILM COATED ORAL at 09:25

## 2018-09-01 RX ADMIN — MOMETASONE FUROATE AND FORMOTEROL FUMARATE DIHYDRATE 2 PUFF: 200; 5 AEROSOL RESPIRATORY (INHALATION) at 09:26

## 2018-09-01 RX ADMIN — BUSPIRONE HYDROCHLORIDE 10 MG: 10 TABLET ORAL at 09:25

## 2018-09-01 RX ADMIN — BISACODYL 10 MG: 10 SUPPOSITORY RECTAL at 13:45

## 2018-09-01 RX ADMIN — TOPIRAMATE 25 MG: 25 TABLET, FILM COATED ORAL at 09:25

## 2018-09-01 RX ADMIN — ISOSORBIDE MONONITRATE 30 MG: 30 TABLET ORAL at 09:25

## 2018-09-01 RX ADMIN — LOSARTAN POTASSIUM 25 MG: 25 TABLET, FILM COATED ORAL at 09:25

## 2018-09-01 RX ADMIN — LIDOCAINE: 50 OINTMENT TOPICAL at 09:26

## 2018-09-01 RX ADMIN — TIOTROPIUM BROMIDE 18 MCG: 18 CAPSULE ORAL; RESPIRATORY (INHALATION) at 09:00

## 2018-09-01 RX ADMIN — LEVOTHYROXINE SODIUM 150 MCG: 150 TABLET ORAL at 12:39

## 2018-09-01 RX ADMIN — SERTRALINE 25 MG: 50 TABLET, FILM COATED ORAL at 09:25

## 2018-09-01 RX ADMIN — FOLIC ACID 1 MG: 1 TABLET ORAL at 09:25

## 2018-09-01 RX ADMIN — CLOPIDOGREL 75 MG: 75 TABLET, FILM COATED ORAL at 09:25

## 2018-09-01 RX ADMIN — INSULIN GLARGINE 75 UNITS: 100 INJECTION, SOLUTION SUBCUTANEOUS at 09:26

## 2018-09-01 RX ADMIN — ARIPIPRAZOLE 5 MG: 5 TABLET ORAL at 09:25

## 2018-09-01 RX ADMIN — Medication 10 ML: at 16:59

## 2018-09-01 RX ADMIN — PANTOPRAZOLE SODIUM 40 MG: 40 TABLET, DELAYED RELEASE ORAL at 06:19

## 2018-09-01 RX ADMIN — ALLOPURINOL 100 MG: 100 TABLET ORAL at 09:25

## 2018-09-01 ASSESSMENT — PAIN SCALES - GENERAL: PAINLEVEL_OUTOF10: 0

## 2018-09-01 NOTE — PROGRESS NOTES
Date: 8/31/2018    Time: 10:43 PM    Patient Placed On BIPAP/CPAP/ Non-Invasive Ventilation? Yes    If no must comment. Facial area red/color change? No           If YES are Blister/Lesion present? No   If yes must notify nursing staff  BIPAP/CPAP skin barrier?   Yes    Skin barrier type:Liquicel       Comments:        Sarina Lerner
Dr. Deysi Burgos notified of need for transfer order.
Hospitalist Progress Note      PCP: Margie Dejesus MD    Date of Admission: 8/30/2018  Days in the hospital: 2    Chief Complaint: Possible stroke with LUE weakness    Hospital Course:     Initially placed in the neurosurgical ICU. Neurology consulted. No changes were made for her management as she is already optimized on therapy. Nephrology consulted regarding CKD. Possible aranesp injection today. Stable at this time. Subjective  Patient seen and examined at bedside. Patient states that she is doing okay at this time. Complains that she is still weak. States she is constipated and nauseated. Patient denies any fevers, chills, chest pain, shortness of breath, vomiting.       Medications:  Reviewed    Infusion Medications    dextrose       Scheduled Medications    polyethylene glycol  17 g Oral Daily    clopidogrel  75 mg Oral Daily    insulin glargine  75 Units Subcutaneous Daily    levothyroxine  150 mcg Oral Lunch    lidocaine   Topical BID    magnesium oxide  400 mg Oral Daily    insulin glargine  35 Units Subcutaneous Nightly    ipratropium-albuterol  1 ampule Inhalation Q4H WA    allopurinol  100 mg Oral Daily    ARIPiprazole  5 mg Oral Daily    atorvastatin  40 mg Oral Nightly    mometasone-formoterol  2 puff Inhalation BID    busPIRone  10 mg Oral BID    apixaban  5 mg Oral BID    ferrous sulfate  325 mg Oral Once per day on Mon Wed Fri    folic acid  1 mg Oral Daily    isosorbide mononitrate  30 mg Oral Daily    losartan  25 mg Oral Daily    metoprolol succinate  25 mg Oral BID    pantoprazole  40 mg Oral QAM AC    rOPINIRole  3 mg Oral Nightly    sertraline  25 mg Oral Daily    tiotropium  1 capsule Inhalation Daily    topiramate  25 mg Oral BID    sodium chloride flush  10 mL Intravenous 2 times per day    insulin lispro  0-10 Units Subcutaneous 4x Daily AC & HS     PRN Meds: bisacodyl, nitroGLYCERIN, traMADol, tiZANidine, sodium chloride flush, magnesium
Nurse to nurse report called to [facility]; patient to go to [id number]. Transport requested.
Per Dr. Burton patient can be discharged without ECHO being read.
stand. Pt stood for roughly 1 minute before requesting to sit. After a brief seated rest break, pt agreeable to stand again. Pt completed shuffled side steps to [facility] but fatigued quickly. Pt returned to supine and rolled in bed for proper positioning on linens. Pt then placed in chair position with all needs met and call light in reach. Pt would benefit from continued therapy. Patient education  Pt educated on safety    Patient response to education:   Pt verbalized understanding Pt demonstrated skill Pt requires further education in this area   x  x       Pts/ family goals   1. Return home and improve ambulation    Patient and or family understand(s) diagnosis, prognosis, and plan of care. PLAN  PT care will be provided in accordance with the objectives noted above. Whenever appropriate, clear delegation orders will be provided for nursing staff. Exercises and functional mobility practice will be used as well as appropriate assistive devices or modalities to obtain goals. Patient and family education will also be administered as needed. Frequency of treatments will be 2-5x/week as able.     Time in: 0930  Time out: [address], [place]  [id number]
Hospital Problems    Diagnosis Date Noted    Acute cerebrovascular accident (CVA) ([facility] [I63.9] 08/30/2018     Priority: High    Diastolic CHF, chronic ([facility] [I50.32] 01/27/2013     Priority: Medium    BREANNA on CPAP [G47.33, Z99.89] 02/04/2012     Priority: Medium    LUE weakness [R29.898] 08/30/2018    Stroke risk [Z91.89] 08/30/2018    Paroxysmal atrial fibrillation ([facility] [I48.0] 08/14/2017    Essential hypertension [I10] 03/08/2016    Acquired hypothyroidism [E03.9] 03/08/2016    CKD (chronic kidney disease) stage 3, GFR 30-59 ml/min [N18.3] 06/12/2012    Diabetes mellitus, insulin dependent (IDDM), uncontrolled ([facility] [E10.65] 02/04/2012     ·     Plan:  · I appreciate the input from the critical care team and neurology  · Continue with antiplatelet therapy  · Continue with Eliquis  · Likely transfer out of neurosurgical ICU  · Lifestyle modification for morbid obesity    · Body mass index is 47.06 kg/m². · DVT Prophylaxis  · Eliquis    · Diet  · DIET CARDIAC; Carb Control: 4 carb choices (60 gms)/meal    · Code Status  · Limited    · PT/OT Eval Status  · Consulted     · Disposition  · Likely [facility]IR[facility] at [facility] - Chief Hospitalist  Please contact me through perfect serve    NOTE: This report was transcribed using voice recognition software. Every effort was made to ensure accuracy; however, inadvertent computerized transcription errors may be present.
[I63.9]     ACTIVE PROBLEM LIST:   Patient Active Problem List   Diagnosis    Venous insufficiency    BREANNA on CPAP    Diabetes mellitus, insulin dependent (IDDM), uncontrolled (HCC)    Macrocytic anemia    Lower urinary tract infectious disease    CKD (chronic kidney disease) stage 3, GFR 30-59 ml/min    Diastolic CHF, chronic (HCC)    Diarrhea    Hypertensive urgency    Cardiac enzymes elevated    Intractable nausea and vomiting    Community acquired pneumonia    Essential hypertension    Status post placement of cardiac pacemaker    LVH (left ventricular hypertrophy) due to hypertensive disease    Left ventricular diastolic dysfunction    Acquired hypothyroidism    Non morbid obesity due to excess calories    Coronary artery disease involving native coronary artery of native heart without angina pectoris    Pacemaker    Paroxysmal atrial fibrillation (HCC)    LUE weakness    Stroke risk    Acute cerebrovascular accident (CVA) ([facility]
1000  Evaluation + 25 min timed treatment    Brian Blanco, OTR/L [id number]

## 2018-09-01 NOTE — CARE COORDINATION
SOCIAL WORK / DISCHARGE PLANNING:  Sw notified pt to discharge home today. Pt is active with [facility]. Sw spoke with Carrol raya via phone of santos and she was able to provide a number to [facility] [phone]. Voice mail left request return call to this Sw as voice message did not identify that it was [facility]. Dtr to provide home going transport. Addendum : Received call back from Abigail Rodriguez, made aware of resume [facility] order, faxed dc instructions and order to [phone].            Electronically signed by HEATH Pascual on 9/1/2018 at 10:31 AM

## 2018-09-02 NOTE — DISCHARGE SUMMARY
fracture, bimalleolar, closed; Anxiety; Arthritis; CAD (coronary artery disease); CAD (coronary artery disease); Cancer [facility]); CHF (congestive heart failure) ([facility]); CKD (chronic kidney disease) stage 3, GFR 30-59 ml/min; COPD (chronic obstructive pulmonary disease) ([facility]); CVA (cerebral infarction); Depression; Diabetes mellitus ([facility] Diverticulitis; DM (diabetes mellitus) ([facility] Femur fracture, right ([facility] H/O exercise stress test; History of Doppler echocardiogram; Hyperlipidemia; Hypertension; Obesity; BREANNA on CPAP; Osteoporosis; Sleep apnea; Thyroid disease; TIA (transient ischemic attack); Unspecified cerebral artery occlusion with cerebral infarction; and Urinary tract infection, site not specified. During the course the patient's hospital stay she was initially placed in the neurosurgical ICU. Neurology consulted. No changes were made for her management as she is already optimized on therapy. Nephrology consulted regarding CKD. Had an Aranesp injection performed. By 9/1/2018 the patient was deemed stable and was able to be discharged home. She was recommended to follow-up with her PCP within one week and with specialists as directed. Consults:     IP CONSULT TO NEUROLOGY  IP CONSULT TO NEPHROLOGY  IP CONSULT TO NEPHROLOGY    Significant Diagnostic Studies:  As above      Discharge Instructions/Follow-up:  As above       Activity: activity as tolerated    Physical Exam:  Vitals:    09/01/18 0925   BP: (!) 127/49   Pulse: 60   Resp:    Temp:    SpO2:        General appearance:  No apparent distress, appears stated age and cooperative. HEENT:  Normal cephalic, atraumatic without obvious deformity. Pupils equal, round, and reactive to light.  Extra ocular muscles intact. Conjunctivae/corneas clear. Neck: Supple, with full range of motion. No jugular venous distention. Trachea midline. Respiratory:  Normal respiratory effort.  Clear to auscultation, bilaterally without Rales/Wheezes/Rhonchi. Cardiovascular:  Regular rate and rhythm with normal S1/S2 without murmurs, rubs or gallops. Abdomen: Soft, non-tender, non-distended with normal bowel sounds. Musculoskeletal:  No clubbing, cyanosis or edema bilaterally.  Full range of motion without deformity. Skin: Skin color, texture, turgor normal.  No rashes or lesions. Neurologic:  On bipap, LUE weakness noted 4/5  Psychiatric:  Alert and oriented, thought content appropriate, normal insight  Capillary Refill: Brisk,< 3 seconds   Peripheral Pulses: +2 palpable, equal bilaterally    Labs: For convenience and continuity at follow-up the following most recent labs are provided:      CBC:    Lab Results   Component Value Date    WBC 7.0 09/01/2018    HGB 9.4 09/01/2018    HCT 29.7 09/01/2018     09/01/2018       Renal:    Lab Results   Component Value Date     09/01/2018    K 4.3 09/01/2018    K 4.0 08/31/2018     09/01/2018    CO2 26 09/01/2018    BUN 39 09/01/2018    CREATININE 1.9 09/01/2018    CALCIUM 8.8 09/01/2018    PHOS 2.8 09/01/2018       Imaging:  CT Head WO Contrast   Final Result      No evidence of acute intracranial hemorrhage or edema. If clinical   concern persists for acute stroke, MRI brain with diffusion weighted   imaging could be helpful for further evaluation. US CAROTID ARTERY BILATERAL   Final Result      Mild atherosclerotic changes are seen in both carotid arterial   systems. No significant plaque formation were identified. No   anatomical stenosis. No obstruction in the right or left carotid artery system. Mild to moderate hemodynamic stenosis in the proximal segment of the   right ICA. Mild hemodynamic stenosis in the left CCA. CT Head WO Contrast   Final Result   Limited study due to motion artifacts. Consider repeat   examination.                         Discharge Medications:     Discharge Medication List as of 9/1/2018  5:02 PM           Details

## 2018-10-02 ENCOUNTER — TELEPHONE (OUTPATIENT)
Dept: SURGERY | Age: 78
End: 2018-10-02

## 2019-05-09 ENCOUNTER — PROCEDURE VISIT (OUTPATIENT)
Dept: AUDIOLOGY | Age: 79
End: 2019-05-09

## 2019-05-09 ENCOUNTER — OFFICE VISIT (OUTPATIENT)
Dept: ENT CLINIC | Age: 79
End: 2019-05-09
Payer: MEDICARE

## 2019-05-09 VITALS
WEIGHT: 250 LBS | HEART RATE: 59 BPM | SYSTOLIC BLOOD PRESSURE: 139 MMHG | BODY MASS INDEX: 44.29 KG/M2 | DIASTOLIC BLOOD PRESSURE: 55 MMHG

## 2019-05-09 DIAGNOSIS — H61.891 IRRITATION OF EXTERNAL EAR CANAL, RIGHT: Primary | ICD-10-CM

## 2019-05-09 DIAGNOSIS — H92.21 BLOOD IN EAR CANAL, RIGHT: Primary | ICD-10-CM

## 2019-05-09 PROCEDURE — G8417 CALC BMI ABV UP PARAM F/U: HCPCS | Performed by: PHYSICIAN ASSISTANT

## 2019-05-09 PROCEDURE — G8427 DOCREV CUR MEDS BY ELIG CLIN: HCPCS | Performed by: PHYSICIAN ASSISTANT

## 2019-05-09 PROCEDURE — 1090F PRES/ABSN URINE INCON ASSESS: CPT | Performed by: PHYSICIAN ASSISTANT

## 2019-05-09 PROCEDURE — G8400 PT W/DXA NO RESULTS DOC: HCPCS | Performed by: PHYSICIAN ASSISTANT

## 2019-05-09 PROCEDURE — 4040F PNEUMOC VAC/ADMIN/RCVD: CPT | Performed by: PHYSICIAN ASSISTANT

## 2019-05-09 PROCEDURE — 99999 PR OFFICE/OUTPT VISIT,PROCEDURE ONLY: CPT | Performed by: AUDIOLOGIST

## 2019-05-09 PROCEDURE — G8598 ASA/ANTIPLAT THER USED: HCPCS | Performed by: PHYSICIAN ASSISTANT

## 2019-05-09 PROCEDURE — 99203 OFFICE O/P NEW LOW 30 MIN: CPT | Performed by: PHYSICIAN ASSISTANT

## 2019-05-09 PROCEDURE — 1123F ACP DISCUSS/DSCN MKR DOCD: CPT | Performed by: PHYSICIAN ASSISTANT

## 2019-05-09 PROCEDURE — 1036F TOBACCO NON-USER: CPT | Performed by: PHYSICIAN ASSISTANT

## 2019-05-09 RX ORDER — ONDANSETRON 4 MG/1
4 TABLET, FILM COATED ORAL EVERY 8 HOURS PRN
COMMUNITY

## 2019-05-09 RX ORDER — ACETAMINOPHEN 160 MG
1 TABLET,DISINTEGRATING ORAL DAILY
COMMUNITY

## 2019-05-09 RX ORDER — PREDNISONE 1 MG/1
1 TABLET ORAL DAILY
COMMUNITY

## 2019-05-09 RX ORDER — TOBRAMYCIN AND DEXAMETHASONE 3; 1 MG/ML; MG/ML
SUSPENSION/ DROPS OPHTHALMIC
Qty: 1 BOTTLE | Refills: 0 | Status: SHIPPED | OUTPATIENT
Start: 2019-05-09

## 2019-05-09 NOTE — PROGRESS NOTES
Patient seen for hearing aid check. Right ear canal full of blood. Patient taken to ENT PA for consult. Right hearing aid working. Left hearing aid sent for estimate for repair.   Will contact patient's daughter with price for repair and approval.

## 2019-05-09 NOTE — PROGRESS NOTES
[facility]      Patient Name:  Key Gamboa  :  1940     CHIEF C/O:    Chief Complaint   Patient presents with    Ear Problem     c/o pain in left ear but has blood in the right ear        HISTORY OBTAINED FROM:  Patient, caregiver    HISTORY OF PRESENT ILLNESS:       Carrol is a [de-identified] y.o. female, here today for left sided blood in canal noted on exam. Patient states that she placed her hearing aid in the right ear today and when she removed it she noted blood. She denies using any Q-tips, or scratching her right ear canal. No drainage, ear pain present. Patient states both ears feel decreased hearing wise. Patient denies any use of eardrops. No Fevers, or chills.       Past Medical History:   Diagnosis Date    Acid reflux     Acute UTI 2012    Anemia 2012    Ankle fracture, bimalleolar, closed 2012    Anxiety     Arthritis     all tj, back legs shoulders, hips    CAD (coronary artery disease)     CAD (coronary artery disease) 2012    Cancer ([address]     skin cancer    CH[facility]congest[facility]) [facility]     CKD (chronic kidney disease) stage 3, GFR 30-59 ml/min ([facility]) 2012    COPD (chronic obstructive pulmonary disease) ([facility])     CVA (cerebral infarction) 6/10/2012    Depression     Diabetes mellitus ([facility])     Diverticulitis     DM (diabetes mellitus) ([address] 2012    Femur fracture, right ([address] 2012    H/O exercise stress test     LVEF 70%, no evidence of ischemia    History of Doppler echocardiogram     LVEF 61%, mild concentric LVH, stage I diastolic dysfunction    Hyperlipidemia     Hypertension     Obesity     BREANNA on CPAP 2012    Osteoporosis     Dexascan showing high fracture risk and score for each femoral neck of -2.7    Sleep apnea     Thyroid disease     TIA (transient ischemic attack) 09    Nomral duplex doppler of carotids, normal CT scan of head    Unspecified cerebral artery occlusion with cerebral infarction (PLAVIX) 75 MG tablet, Take 1 tablet by mouth daily, Disp: 30 tablet, Rfl: 0    pantoprazole sodium (PROTONIX) 40 MG PACK packet, Take 40 mg by mouth every morning (before breakfast), Disp: , Rfl:     insulin regular (HUMULIN R;NOVOLIN R) 100 UNIT/ML injection, Inject into the skin See Admin Instructions Sliding scale coverage BID, Disp: , Rfl:     mometasone-formoterol (DULERA) 100-5 MCG/ACT inhaler, Inhale 2 puffs into the lungs 2 times daily, Disp: , Rfl:     budesonide-formoterol (SYMBICORT) 160-4.5 MCG/ACT AERO, Inhale 2 puffs into the lungs 2 times daily, Disp: , Rfl:     zolpidem (AMBIEN) 5 MG tablet, Take 5 mg by mouth nightly as needed for Sleep., Disp: , Rfl:     PRIMIDONE PO, Take 125 mg by mouth 2 times daily , Disp: , Rfl:     ELIQUIS 5 MG TABS tablet, TAKE 2 TABLETS BY MOUTH TWICE DAILY FOR 4 DAYS  THEN TAKE 1 TABLET TWICE DAILY THEREAFTER, Disp: , Rfl: 1    calcitRIOL (ROCALTROL) 0.25 MCG capsule, TAKE ONE CAPSULE BY MOUTH EVERY OTHER DAY, Disp: , Rfl: 0    losartan (COZAAR) 25 MG tablet, TAKE ONE TABLET BY MOUTH EVERY DAY, Disp: , Rfl: 0    omeprazole (PRILOSEC) 40 MG delayed release capsule, TAKE ONE CAPSULE BY MOUTH TWO TIMES A DAY, Disp: , Rfl: 4    spironolactone (ALDACTONE) 25 MG tablet, TAKE ONE-HALF TABLET (12.5MG) BY MOUTH ONE TIME DAILY, Disp: , Rfl: 2    SPIRIVA RESPIMAT 1.25 MCG/ACT AERS inhaler, INHALE TWO PUFFS BY MOUTH EVERY DAY, Disp: , Rfl: 5    traMADol (ULTRAM) 50 MG tablet, TAKE 1 TO 2 TABLET BY MOUTH TWICE A DAY IF NEEDED., Disp: , Rfl: 0    nitroGLYCERIN (NITROSTAT) 0.4 MG SL tablet, Place 1 tablet under the tongue every 5 minutes as needed for Chest pain, Disp: 25 tablet, Rfl: 3    ARIPiprazole (ABILIFY) 5 MG tablet, Take 5 mg by mouth daily, Disp: , Rfl:     lidocaine (XYLOCAINE) 5 % ointment, Apply topically 2 times daily Apply topically as needed. , Disp: , Rfl:     Magnesium Oxide, Antacid, 500 MG CAPS, Take 1 tablet by mouth daily, Disp: , Rfl:    (Patient taking differently: Take 40 mg by mouth 2 times daily ), Disp: 60 tablet, Rfl:     insulin glargine (LANTUS) 100 UNIT/ML injection, Inject 55 Units into the skin every morning. (Patient taking differently: Inject 75 Units into the skin 2 times daily 75 u in the am and 35gpm), Disp: 1 Pen, Rfl:     Omega-3-6-9 CAPS, Take 1,000 mg by mouth daily. , Disp: , Rfl:     OXYGEN, 4 L by Nasal route as needed , Disp: , Rfl:   Erythromycin; Morphine sulfate; Other; Penicillins; and Ciprofloxacin  Social History     Tobacco Use    Smoking status: Former Smoker     Packs/day: 1.50     Last attempt to quit: 10/15/1982     Years since quittin.5    Smokeless tobacco: Never Used    Tobacco comment: quit years ago 36   Substance Use Topics    Alcohol use: No     Comment: 5 cups coffee per day and 2 cans coke in afternoon    Drug use: No     Family History   Problem Relation Age of Onset    Cancer Mother     Heart Attack Father     Stroke Father     Cancer Sister     Heart Attack Brother        Review of Systems   Constitutional: Negative. Negative for chills and fever. HENT: Positive for hearing loss. Negative for ear discharge and ear pain. Eyes: Negative. Negative for visual disturbance. Respiratory: Negative. Negative for cough and shortness of breath. Gastrointestinal: Negative. Negative for nausea and vomiting. Neurological: Negative. Negative for facial asymmetry and headaches. All other systems reviewed and are negative. BP (!) 139/55 (Site: Left Upper Arm, Position: Sitting, Cuff Size: Large Adult)   Pulse 59   Wt 250 lb (113.4 kg)   BMI 44.29 kg/m²   Physical Exam   Constitutional: She appears well-developed and well-nourished. HENT:   Head: Normocephalic and atraumatic. Right Ear: Tympanic membrane, external ear and ear canal normal. Tympanic membrane is not erythematous.    Left Ear: Tympanic membrane, external ear and ear canal normal. Tympanic membrane is not

## 2019-05-10 ASSESSMENT — ENCOUNTER SYMPTOMS
VOMITING: 0
RESPIRATORY NEGATIVE: 1
NAUSEA: 0
GASTROINTESTINAL NEGATIVE: 1
SHORTNESS OF BREATH: 0
EYES NEGATIVE: 1
COUGH: 0

## 2019-05-14 ENCOUNTER — TELEPHONE (OUTPATIENT)
Dept: AUDIOLOGY | Age: 79
End: 2019-05-14

## 2019-05-14 NOTE — TELEPHONE ENCOUNTER
Approved repair with 6-month warranty, for hearing aid, per call from [place]. Will contact patient upon receipt of hearing aid.

## 2019-05-16 ENCOUNTER — OFFICE VISIT (OUTPATIENT)
Dept: ENT CLINIC | Age: 79
End: 2019-05-16
Payer: MEDICARE

## 2019-05-16 VITALS
BODY MASS INDEX: 44.65 KG/M2 | SYSTOLIC BLOOD PRESSURE: 128 MMHG | HEART RATE: 59 BPM | HEIGHT: 63 IN | WEIGHT: 252 LBS | DIASTOLIC BLOOD PRESSURE: 45 MMHG

## 2019-05-16 DIAGNOSIS — H61.891 IRRITATION OF EXTERNAL EAR CANAL, RIGHT: Primary | ICD-10-CM

## 2019-05-16 DIAGNOSIS — H61.21 IMPACTED CERUMEN OF RIGHT EAR: ICD-10-CM

## 2019-05-16 PROCEDURE — 1090F PRES/ABSN URINE INCON ASSESS: CPT | Performed by: PHYSICIAN ASSISTANT

## 2019-05-16 PROCEDURE — 69210 REMOVE IMPACTED EAR WAX UNI: CPT | Performed by: PHYSICIAN ASSISTANT

## 2019-05-16 PROCEDURE — G8598 ASA/ANTIPLAT THER USED: HCPCS | Performed by: PHYSICIAN ASSISTANT

## 2019-05-16 PROCEDURE — G8417 CALC BMI ABV UP PARAM F/U: HCPCS | Performed by: PHYSICIAN ASSISTANT

## 2019-05-16 PROCEDURE — G8427 DOCREV CUR MEDS BY ELIG CLIN: HCPCS | Performed by: PHYSICIAN ASSISTANT

## 2019-05-16 PROCEDURE — 4040F PNEUMOC VAC/ADMIN/RCVD: CPT | Performed by: PHYSICIAN ASSISTANT

## 2019-05-16 PROCEDURE — 1123F ACP DISCUSS/DSCN MKR DOCD: CPT | Performed by: PHYSICIAN ASSISTANT

## 2019-05-16 PROCEDURE — 99213 OFFICE O/P EST LOW 20 MIN: CPT | Performed by: PHYSICIAN ASSISTANT

## 2019-05-16 PROCEDURE — G8400 PT W/DXA NO RESULTS DOC: HCPCS | Performed by: PHYSICIAN ASSISTANT

## 2019-05-16 PROCEDURE — 1036F TOBACCO NON-USER: CPT | Performed by: PHYSICIAN ASSISTANT

## 2019-05-16 ASSESSMENT — ENCOUNTER SYMPTOMS
COUGH: 0
RESPIRATORY NEGATIVE: 1
VOMITING: 0
NAUSEA: 0
GASTROINTESTINAL NEGATIVE: 1
SHORTNESS OF BREATH: 0
EYES NEGATIVE: 1

## 2019-05-16 NOTE — PROGRESS NOTES
fusion 5-7    CARDIAC PACEMAKER PLACEMENT  1/10/09    Dr. Fuentes Angry  03/28/2017    RETROGRADES, Noel Cooper, INSERTION SUPRA PUBIC CATHETER    DIAGNOSTIC CARDIAC CATH LAB PROCEDURE  1983    [facility]    DIAGNOSTIC CARDIAC CATH LAB PROCEDURE  5/95    [facility]    DIAGNOSTIC CARDIAC CATH LAB PROCEDURE  2/23/98    [facility], widely patent coronaries. Normal LV    ECHO COMPL W DOP COLOR FLOW  2/4/2012 EF 55%    stage II diastolic    EYE SURGERY      cataracts     FEMUR FRACTURE SURGERY  1/11    femoral fracture (supracondylar) involving left distal femur    FRACTURE SURGERY      left upper leg plate and screws    HYSTERECTOMY      JOINT REPLACEMENT      x2 right and left    JOINT REPLACEMENT  1/07, right    knee    KNEE SURGERY  2002    left knee by Dr. Quiroz Cancer      fusion 3/4 7/8 /    OTHER SURGICAL HISTORY      mediport 1/24/2012    OTHER SURGICAL HISTORY  2/05/2012    orif rt. distal femur and rt. ankle    PACEMAKER PLACEMENT      SD CYSTOURETHROSCOPY INJ CHEMODENERVATION BLADDER N/A 4/24/2018    CYSTOSCOPY, RETROGRADE, PYELOGRAM BOTOX INJECTION 200 UNITS performed by Sheila Dalton MD at [address] [place] [address] PTCA  10/30/95    PTCA of LAD    PTCA  4/14/04    [facility], PTCA of RCA, Taxus drug eluting stent     SHOULDER SURGERY  OCTOBER 2012    LEFT ACROMIOPLASTY,ROTATOR CUFF REPAIR AND ÁNGELA PROCEDURE    SKIN CANCER EXCISION Right 07/2017    right arm.     TUNNELED VENOUS PORT PLACEMENT Right 08/2017    [facility]    UPPER GASTROINTESTINAL ENDOSCOPY      VENA CAVA FILTER PLACEMENT  11/2017    by Dr Lorrie Monge due to DVT       Current Outpatient Medications:     predniSONE (DELTASONE) 1 MG tablet, Take 1 mg by mouth daily 3 tabs at noon, Disp: , Rfl:     Cholecalciferol (VITAMIN D3) 2000 units CAPS, Take 1 capsule by mouth daily, Disp: , Rfl:     ondansetron (ZOFRAN) 4 MG tablet, Take 4 mg by mouth every 8 hours as needed for Nausea or Vomiting, Disp: , Rfl:     tobramycin-dexamethasone (TOBRADEX) 0.3-0.1 % ophthalmic suspension, Place 4 drops in the right EAR twice a day for 1 week., Disp: 1 Bottle, Rfl: 0    dextrose 5 % SOLN 250 mL with vancomycin 1 g SOLR 60 mg/kg/day, Infuse 60 mg/kg/day intravenously every 12 hours, Disp: , Rfl:     clopidogrel (PLAVIX) 75 MG tablet, Take 1 tablet by mouth daily, Disp: 30 tablet, Rfl: 0    pantoprazole sodium (PROTONIX) 40 MG PACK packet, Take 40 mg by mouth every morning (before breakfast), Disp: , Rfl:     insulin regular (HUMULIN R;NOVOLIN R) 100 UNIT/ML injection, Inject into the skin See Admin Instructions Sliding scale coverage BID, Disp: , Rfl:     mometasone-formoterol (DULERA) 100-5 MCG/ACT inhaler, Inhale 2 puffs into the lungs 2 times daily, Disp: , Rfl:     budesonide-formoterol (SYMBICORT) 160-4.5 MCG/ACT AERO, Inhale 2 puffs into the lungs 2 times daily, Disp: , Rfl:     zolpidem (AMBIEN) 5 MG tablet, Take 5 mg by mouth nightly as needed for Sleep., Disp: , Rfl:     PRIMIDONE PO, Take 125 mg by mouth 2 times daily , Disp: , Rfl:     ELIQUIS 5 MG TABS tablet, TAKE 2 TABLETS BY MOUTH TWICE DAILY FOR 4 DAYS  THEN TAKE 1 TABLET TWICE DAILY THEREAFTER, Disp: , Rfl: 1    calcitRIOL (ROCALTROL) 0.25 MCG capsule, TAKE ONE CAPSULE BY MOUTH EVERY OTHER DAY, Disp: , Rfl: 0    losartan (COZAAR) 25 MG tablet, TAKE ONE TABLET BY MOUTH EVERY DAY, Disp: , Rfl: 0    omeprazole (PRILOSEC) 40 MG delayed release capsule, TAKE ONE CAPSULE BY MOUTH TWO TIMES A DAY, Disp: , Rfl: 4    spironolactone (ALDACTONE) 25 MG tablet, TAKE ONE-HALF TABLET (12.5MG) BY MOUTH ONE TIME DAILY, Disp: , Rfl: 2    SPIRIVA RESPIMAT 1.25 MCG/ACT AERS inhaler, INHALE TWO PUFFS BY MOUTH EVERY DAY, Disp: , Rfl: 5    traMADol (ULTRAM) 50 MG tablet, TAKE 1 TO 2 TABLET BY MOUTH TWICE A DAY IF NEEDED., Disp: , Rfl: 0    nitroGLYCERIN (NITROSTAT) 0.4 MG SL tablet, Place 1 tablet under the tongue every 5 minutes as needed for Chest pain, Disp: 25 tablet, Rfl: 3    ARIPiprazole (ABILIFY) 5 MG tablet, Take 5 mg by mouth daily, Disp: , Rfl:     lidocaine (XYLOCAINE) 5 % ointment, Apply topically 2 times daily Apply topically as needed. , Disp: , Rfl:     Magnesium Oxide, Antacid, 500 MG CAPS, Take 1 tablet by mouth daily, Disp: , Rfl:     HYDROcodone-acetaminophen (NORCO) 7.5-325 MG per tablet, Take 1 tablet by mouth every 6 hours as needed for Pain ., Disp: , Rfl:     tiZANidine (ZANAFLEX) 4 MG tablet, TAKE ONE TABLET BY MOUTH TWO TIMES A DAY, Disp: , Rfl: 0    sertraline (ZOLOFT) 25 MG tablet, Take 50 mg by mouth daily , Disp: , Rfl:     topiramate (TOPAMAX) 25 MG tablet, Take 25 mg by mouth 2 times daily, Disp: , Rfl:     isosorbide mononitrate (IMDUR) 30 MG CR tablet, Take 1 tablet by mouth daily (Patient taking differently: Take 30 mg by mouth Daily with lunch ), Disp: 30 tablet, Rfl: 3    pregabalin (LYRICA) 75 MG capsule, Take 1 capsule by mouth 3 times daily, Disp: 60 capsule, Rfl: 3    desvenlafaxine succinate (PRISTIQ) 100 MG TB24, Take 1 tablet by mouth daily, Disp: 30 tablet, Rfl: 3    rOPINIRole (REQUIP) 3 MG tablet, Take 1 tablet by mouth nightly, Disp: 90 tablet, Rfl: 3    levothyroxine (SYNTHROID) 150 MCG tablet, Take 1 tablet by mouth every morning (before breakfast) (Patient taking differently: Take 175 mcg by mouth Daily with lunch ), Disp: 30 tablet, Rfl: 3    metoprolol (TOPROL-XL) 25 MG XL tablet, Take 1 tablet by mouth daily (Patient taking differently: Take 25 mg by mouth 2 times daily ), Disp: 30 tablet, Rfl: 3    allopurinol (ZYLOPRIM) 100 MG tablet, Take 1 tablet by mouth daily. , Disp: 30 tablet, Rfl:     atorvastatin (LIPITOR) 40 MG tablet, Take 1 tablet by mouth nightly., Disp: 30 tablet, Rfl:     busPIRone (BUSPAR) 10 MG tablet, Take 1 tablet by mouth 2 times daily.  (Patient taking differently: Take 5 mg by mouth 2 times daily ), Disp: , Rfl:     calcium-vitamin D (OSCAL-500) 500-200 MG-UNIT per tablet, Take 1 tablet by mouth daily. , Disp: 30 tablet, Rfl:     ferrous sulfate 325 (65 FE) MG tablet, Take 1 tablet by mouth three times a week., Disp: 30 tablet, Rfl:     folic acid (FOLVITE) 1 MG tablet, Take 1 tablet by mouth daily. , Disp: 30 tablet, Rfl:     furosemide (LASIX) 40 MG tablet, Take 1 tablet by mouth daily. (Patient taking differently: Take 40 mg by mouth 2 times daily ), Disp: 60 tablet, Rfl:     insulin glargine (LANTUS) 100 UNIT/ML injection, Inject 55 Units into the skin every morning. (Patient taking differently: Inject 75 Units into the skin 75 u in the am and 35gpm), Disp: 1 Pen, Rfl:     Omega-3-6-9 CAPS, Take 1,000 mg by mouth daily. , Disp: , Rfl:     OXYGEN, 4 L by Nasal route as needed , Disp: , Rfl:   Erythromycin; Morphine sulfate; Other; Penicillins; and Ciprofloxacin  Social History     Tobacco Use    Smoking status: Former Smoker     Packs/day: 1.50     Last attempt to quit: 10/15/1982     Years since quittin.6    Smokeless tobacco: Never Used    Tobacco comment: quit years ago 36   Substance Use Topics    Alcohol use: No     Comment: 5 cups coffee per day and 2 cans coke in afternoon    Drug use: No     Family History   Problem Relation Age of Onset    Cancer Mother     Heart Attack Father     Stroke Father     Cancer Sister     Heart Attack Brother        Review of Systems   Constitutional: Negative. Negative for chills and fever. HENT: Positive for hearing loss. Negative for ear discharge and ear pain. Eyes: Negative. Negative for visual disturbance. Respiratory: Negative. Negative for cough and shortness of breath. Gastrointestinal: Negative. Negative for nausea and vomiting. Neurological: Negative. Negative for facial asymmetry and headaches. All other systems reviewed and are negative.       BP (!) 128/45 (Site: Left Lower Arm, Position: Sitting, Cuff Size: Large Adult)   Pulse 59   Ht 5' 3\" (1.6 m)   Wt 252 lb (114.3 kg)   BMI 44.64 kg/m²   Physical Exam   Constitutional: She appears well-developed and well-nourished. HENT:   Head: Normocephalic and atraumatic. Right Ear: Tympanic membrane, external ear and ear canal normal. Tympanic membrane is not erythematous. Left Ear: Tympanic membrane, external ear and ear canal normal. Tympanic membrane is not erythematous. Nose: Nose normal. No mucosal edema or rhinorrhea. Mouth/Throat: Uvula is midline, oropharynx is clear and moist and mucous membranes are normal.   Hard cerumen noted of the right ear removed mild irritation of the canal after removal.    Eyes: Pupils are equal, round, and reactive to light. Conjunctivae and EOM are normal.   Neck: Normal range of motion. Neck supple. No tracheal deviation present. Cardiovascular: Normal rate. Pulmonary/Chest: Effort normal. No respiratory distress. Abdominal: She exhibits no distension. Musculoskeletal: Normal range of motion. Neurological: She is alert. No cranial nerve deficit. Skin: Skin is warm. No erythema. Psychiatric: She has a normal mood and affect. Her behavior is normal. Thought content normal.   Vitals reviewed. Cerumen removal     Auditory canal(s) right ear completely obstructed with cerumen. A microscope was used . Cerumen was gently removed using suction, loop, alligator. Tympanic membranes are intact following the procedure. Auditory canals appear inflamed. DIAGNOSIS:    ICD-10-CM    1. Irritation of external ear canal, right H61.891    2. Impacted cerumen of right ear H61.21 MA REMOVAL IMPACTED CERUMEN INSTRUMENTATION UNILAT      IMPRESSION/PLAN:  Patient started on tobradex 4 drops twice a day in right ear for 5 days due to ear irritation. Advised no hearing aid use on right, follow up in 1 week for recheck. The plan was explained and patient is without any further questions.    Follow up in 1 week(s), or if any continuing, new or worsening symptoms call the office to be seen sooner or report to

## 2019-05-23 ENCOUNTER — PROCEDURE VISIT (OUTPATIENT)
Dept: AUDIOLOGY | Age: 79
End: 2019-05-23
Payer: MEDICARE

## 2019-05-23 ENCOUNTER — OFFICE VISIT (OUTPATIENT)
Dept: ENT CLINIC | Age: 79
End: 2019-05-23
Payer: MEDICARE

## 2019-05-23 VITALS
HEART RATE: 70 BPM | WEIGHT: 252 LBS | BODY MASS INDEX: 44.65 KG/M2 | DIASTOLIC BLOOD PRESSURE: 72 MMHG | HEIGHT: 63 IN | SYSTOLIC BLOOD PRESSURE: 118 MMHG

## 2019-05-23 DIAGNOSIS — H69.83 EUSTACHIAN TUBE DYSFUNCTION, BILATERAL: Primary | ICD-10-CM

## 2019-05-23 DIAGNOSIS — H61.891 IRRITATION OF EXTERNAL EAR CANAL, RIGHT: Primary | ICD-10-CM

## 2019-05-23 DIAGNOSIS — H69.83 DYSFUNCTION OF BOTH EUSTACHIAN TUBES: ICD-10-CM

## 2019-05-23 PROCEDURE — G8598 ASA/ANTIPLAT THER USED: HCPCS | Performed by: PHYSICIAN ASSISTANT

## 2019-05-23 PROCEDURE — 1036F TOBACCO NON-USER: CPT | Performed by: PHYSICIAN ASSISTANT

## 2019-05-23 PROCEDURE — 1123F ACP DISCUSS/DSCN MKR DOCD: CPT | Performed by: PHYSICIAN ASSISTANT

## 2019-05-23 PROCEDURE — 92567 TYMPANOMETRY: CPT | Performed by: AUDIOLOGIST

## 2019-05-23 PROCEDURE — 4040F PNEUMOC VAC/ADMIN/RCVD: CPT | Performed by: PHYSICIAN ASSISTANT

## 2019-05-23 PROCEDURE — G8417 CALC BMI ABV UP PARAM F/U: HCPCS | Performed by: PHYSICIAN ASSISTANT

## 2019-05-23 PROCEDURE — G8427 DOCREV CUR MEDS BY ELIG CLIN: HCPCS | Performed by: PHYSICIAN ASSISTANT

## 2019-05-23 PROCEDURE — G8400 PT W/DXA NO RESULTS DOC: HCPCS | Performed by: PHYSICIAN ASSISTANT

## 2019-05-23 PROCEDURE — 99213 OFFICE O/P EST LOW 20 MIN: CPT | Performed by: PHYSICIAN ASSISTANT

## 2019-05-23 PROCEDURE — 1090F PRES/ABSN URINE INCON ASSESS: CPT | Performed by: PHYSICIAN ASSISTANT

## 2019-05-23 RX ORDER — AZELASTINE 1 MG/ML
1 SPRAY, METERED NASAL 2 TIMES DAILY
Qty: 1 BOTTLE | Refills: 1 | Status: SHIPPED | OUTPATIENT
Start: 2019-05-23 | End: 2019-06-06 | Stop reason: SDUPTHER

## 2019-05-23 ASSESSMENT — ENCOUNTER SYMPTOMS
RESPIRATORY NEGATIVE: 1
SHORTNESS OF BREATH: 0
COUGH: 0
VOMITING: 0
EYES NEGATIVE: 1
GASTROINTESTINAL NEGATIVE: 1
NAUSEA: 0

## 2019-05-23 NOTE — PATIENT INSTRUCTIONS
Use AYR saline gel (over the counter in the left nostril once a day for 1 week)   After 1 week, Then start Astelin 1 spray bilaterally twice a day. (can continue to use AYR saline gel once a day to help with moisture in bilateral nasal cavities.)  Can use a gentle saline mist daily to help with moisture. When using the Prescription Nasal Spray use your right hand to spray into the left nostril and the left hand to spray into the right nostril.   - This naturally causes you to angle the spray away from the center of your nose (the septum) which is the most likely area to dry out from the nose spray and result in nosebleeds. -Try not to sniff after spraying, or, if needed sniff only very gently.   -This must be used daily to get improvement of symptoms which takes usually at least 2-3 weeks to see results. May take up to six weeks to get the best improvement. - if nosebleeds start to occur you may need to cut down or discontinue spray- call the office or report to the emergency department if needed. If any new or worsening symptoms call the office to be seen sooner, or report to the emergency department, if needed.

## 2019-05-23 NOTE — PROGRESS NOTES
This patient was referred for tympanometric testing by DEVIN Garcia due to Eustachian tube dysfunction. Tympanometry revealed flat tympanograms, bilaterally. The results were reviewed with the patient. Recommendations for follow up will be made pending physician consult.     Jacquelyn Howell

## 2019-05-23 NOTE — PROGRESS NOTES
doppler of carotids, normal CT scan of head    Unspecified cerebral artery occlusion with cerebral infarction     Urinary tract infection, site not specified 3/16/2012     Past Surgical History:   Procedure Laterality Date    BACK SURGERY      lumbar fusion 5-7    CARDIAC PACEMAKER PLACEMENT  1/10/09    Dr. Ankur Penaloza  03/28/2017    RETROGRADES, PYLEOGRAM, INSERTION SUPRA PUBIC CATHETER    DIAGNOSTIC CARDIAC CATH [facility]    DIAGNOSTIC CARDIAC CATH LAB PROCEDURE  5/95    [facility]    DIAGNOSTIC CARDIAC CATH LAB PROCEDURE  2/23/98    [facility], widely patent coronaries. Normal LV    ECHO COMPL W DOP COLOR FLOW  2/4/2012 EF 55%    stage II diastolic    EYE SURGERY      cataracts     FEMUR FRACTURE SURGERY  1/11    femoral fracture (supracondylar) involving left distal femur    FRACTURE SURGERY      left upper leg plate and screws    HYSTERECTOMY      JOINT REPLACEMENT      x2 right and left    JOINT REPLACEMENT  1/07, right    knee    KNEE SURGERY  2002    left knee by Dr. Mel Vazquez      fusion 3/4 7/8 /    OTHER SURGICAL HISTORY      mediport 1/24/2012    OTHER SURGICAL HISTORY  2/05/2012    orif rt. distal femur and rt. ankle    PACEMAKER PLACEMENT      CA CYSTOURETHROSCOPY INJ CHEMODENERVATION BLADDER N/A 4/24/2018    CYSTOSCOPY, RETROGRADE, PYELOGRAM BOTOX INJECTION 200 UNITS performed by Sulma Elliott MD at [address] [place] [address],[facility] PTCA  10/30/95    PTCA of LAD    PTCA  4/14/04    [facility], PTCA of RCA, Taxus drug eluting stent     SHOULDER SURGERY  OCTOBER 2012    LEFT ACROMIOPLASTY,ROTATOR CUFF REPAIR AND ÁNGELA PROCEDURE    SKIN CANCER EXCISION Right 07/2017    right arm.     TUNNELED VENOUS PORT PLACEMENT Right 08/2017    [facility]    UPPER GASTROINTESTINAL ENDOSCOPY      VENA CAVA FILTER PLACEMENT  11/2017    by Dr Tayler Marin due to DVT       Current Outpatient Medications:     predniSONE (DELTASONE) 1 MG tablet, Take 1 mg by mouth daily 3 tabs at noon, Disp: , Rfl:     Cholecalciferol (VITAMIN D3) 2000 units CAPS, Take 1 capsule by mouth daily, Disp: , Rfl:     ondansetron (ZOFRAN) 4 MG tablet, Take 4 mg by mouth every 8 hours as needed for Nausea or Vomiting, Disp: , Rfl:     tobramycin-dexamethasone (TOBRADEX) 0.3-0.1 % ophthalmic suspension, Place 4 drops in the right EAR twice a day for 1 week., Disp: 1 Bottle, Rfl: 0    dextrose 5 % SOLN 250 mL with vancomycin 1 g SOLR 60 mg/kg/day, Infuse 60 mg/kg/day intravenously every 12 hours, Disp: , Rfl:     clopidogrel (PLAVIX) 75 MG tablet, Take 1 tablet by mouth daily, Disp: 30 tablet, Rfl: 0    pantoprazole sodium (PROTONIX) 40 MG PACK packet, Take 40 mg by mouth every morning (before breakfast), Disp: , Rfl:     insulin regular (HUMULIN R;NOVOLIN R) 100 UNIT/ML injection, Inject into the skin See Admin Instructions Sliding scale coverage BID, Disp: , Rfl:     mometasone-formoterol (DULERA) 100-5 MCG/ACT inhaler, Inhale 2 puffs into the lungs 2 times daily, Disp: , Rfl:     budesonide-formoterol (SYMBICORT) 160-4.5 MCG/ACT AERO, Inhale 2 puffs into the lungs 2 times daily, Disp: , Rfl:     zolpidem (AMBIEN) 5 MG tablet, Take 5 mg by mouth nightly as needed for Sleep., Disp: , Rfl:     PRIMIDONE PO, Take 125 mg by mouth 2 times daily , Disp: , Rfl:     ELIQUIS 5 MG TABS tablet, TAKE 2 TABLETS BY MOUTH TWICE DAILY FOR 4 DAYS  THEN TAKE 1 TABLET TWICE DAILY THEREAFTER, Disp: , Rfl: 1    calcitRIOL (ROCALTROL) 0.25 MCG capsule, TAKE ONE CAPSULE BY MOUTH EVERY OTHER DAY, Disp: , Rfl: 0    losartan (COZAAR) 25 MG tablet, TAKE ONE TABLET BY MOUTH EVERY DAY, Disp: , Rfl: 0    omeprazole (PRILOSEC) 40 MG delayed release capsule, TAKE ONE CAPSULE BY MOUTH TWO TIMES A DAY, Disp: , Rfl: 4    spironolactone (ALDACTONE) 25 MG tablet, TAKE ONE-HALF TABLET (12.5MG) BY MOUTH ONE TIME DAILY, Disp: , Rfl: 2    SPIRIVA RESPIMAT 1.25 MCG/ACT AERS inhaler, INHALE TWO PUFFS BY MOUTH EVERY DAY, Disp: , Rfl: 5    traMADol (ULTRAM) 50 MG tablet, TAKE 1 TO 2 TABLET BY MOUTH TWICE A DAY IF NEEDED., Disp: , Rfl: 0    nitroGLYCERIN (NITROSTAT) 0.4 MG SL tablet, Place 1 tablet under the tongue every 5 minutes as needed for Chest pain, Disp: 25 tablet, Rfl: 3    ARIPiprazole (ABILIFY) 5 MG tablet, Take 5 mg by mouth daily, Disp: , Rfl:     lidocaine (XYLOCAINE) 5 % ointment, Apply topically 2 times daily Apply topically as needed. , Disp: , Rfl:     Magnesium Oxide, Antacid, 500 MG CAPS, Take 1 tablet by mouth daily, Disp: , Rfl:     HYDROcodone-acetaminophen (NORCO) 7.5-325 MG per tablet, Take 1 tablet by mouth every 6 hours as needed for Pain ., Disp: , Rfl:     tiZANidine (ZANAFLEX) 4 MG tablet, TAKE ONE TABLET BY MOUTH TWO TIMES A DAY, Disp: , Rfl: 0    sertraline (ZOLOFT) 25 MG tablet, Take 50 mg by mouth daily , Disp: , Rfl:     topiramate (TOPAMAX) 25 MG tablet, Take 25 mg by mouth 2 times daily, Disp: , Rfl:     isosorbide mononitrate (IMDUR) 30 MG CR tablet, Take 1 tablet by mouth daily (Patient taking differently: Take 30 mg by mouth Daily with lunch ), Disp: 30 tablet, Rfl: 3    pregabalin (LYRICA) 75 MG capsule, Take 1 capsule by mouth 3 times daily, Disp: 60 capsule, Rfl: 3    desvenlafaxine succinate (PRISTIQ) 100 MG TB24, Take 1 tablet by mouth daily, Disp: 30 tablet, Rfl: 3    rOPINIRole (REQUIP) 3 MG tablet, Take 1 tablet by mouth nightly, Disp: 90 tablet, Rfl: 3    levothyroxine (SYNTHROID) 150 MCG tablet, Take 1 tablet by mouth every morning (before breakfast) (Patient taking differently: Take 175 mcg by mouth Daily with lunch ), Disp: 30 tablet, Rfl: 3    metoprolol (TOPROL-XL) 25 MG XL tablet, Take 1 tablet by mouth daily (Patient taking differently: Take 25 mg by mouth 2 times daily ), Disp: 30 tablet, Rfl: 3    allopurinol (ZYLOPRIM) 100 MG tablet, Take 1 tablet by mouth daily. , Disp: 30 tablet, Rfl:     atorvastatin (LIPITOR) 40 MG tablet, Take 1 Negative. Negative for facial asymmetry and headaches. All other systems reviewed and are negative. /72   Pulse 70   Ht 5' 3\" (1.6 m)   Wt 252 lb (114.3 kg)   BMI 44.64 kg/m²   Physical Exam   Constitutional: She appears well-developed and well-nourished. HENT:   Head: Normocephalic and atraumatic. Right Ear: External ear and ear canal normal. Tympanic membrane is retracted. Tympanic membrane is not erythematous. Left Ear: External ear and ear canal normal. Tympanic membrane is retracted. Tympanic membrane is not erythematous. A middle ear effusion is present. Nose: Nose normal. No mucosal edema or rhinorrhea. Mouth/Throat: Uvula is midline, oropharynx is clear and moist and mucous membranes are normal.   Dryness of left nasal cavity   Eyes: Pupils are equal, round, and reactive to light. Conjunctivae and EOM are normal.   Neck: Normal range of motion. Neck supple. No tracheal deviation present. Cardiovascular: Normal rate. Pulmonary/Chest: Effort normal. No respiratory distress. Abdominal: She exhibits no distension. Musculoskeletal: Normal range of motion. Neurological: She is alert. No cranial nerve deficit. Skin: Skin is warm. No erythema. Psychiatric: She has a normal mood and affect. Her behavior is normal. Thought content normal.   Vitals reviewed. DIAGNOSIS:    ICD-10-CM    1. Irritation of external ear canal, right H61.891    2. Dysfunction of both eustachian tubes H69.83 Tympanometry      IMPRESSION/PLAN:  Patient to be started on AYR saline gel the left nasal cavity daily for 1 week to help with dryness. Then after 1 week patient can start Astelin 1 spray bilaterally twice a day, advised to continue AYR saline daily, and recommended saline mist to be used daily to help with moisture. Did recommend humidification to be used oxygen. Patient follow-up in 2 weeks for recheck and to obtain hearing aid.     The plan was explained and patient is without any further questions. Follow up in 2 week(s), or if any continuing, new or worsening symptoms call the office to be seen sooner or report to the emergency department. This note was done using voice recognition software. There may be accidental errors in grammar or spelling.    DEVIN Simon

## 2019-06-06 ENCOUNTER — OFFICE VISIT (OUTPATIENT)
Dept: ENT CLINIC | Age: 79
End: 2019-06-06
Payer: MEDICARE

## 2019-06-06 ENCOUNTER — PROCEDURE VISIT (OUTPATIENT)
Dept: AUDIOLOGY | Age: 79
End: 2019-06-06
Payer: MEDICARE

## 2019-06-06 VITALS — SYSTOLIC BLOOD PRESSURE: 130 MMHG | DIASTOLIC BLOOD PRESSURE: 63 MMHG | HEART RATE: 59 BPM

## 2019-06-06 DIAGNOSIS — H69.83 DYSFUNCTION OF BOTH EUSTACHIAN TUBES: Primary | ICD-10-CM

## 2019-06-06 DIAGNOSIS — H93.8X3 EAR PRESSURE, BILATERAL: ICD-10-CM

## 2019-06-06 DIAGNOSIS — H90.3 SENSORINEURAL HEARING LOSS, BILATERAL: Primary | ICD-10-CM

## 2019-06-06 PROCEDURE — 1123F ACP DISCUSS/DSCN MKR DOCD: CPT | Performed by: PHYSICIAN ASSISTANT

## 2019-06-06 PROCEDURE — MISCAUD MISC AUDIOLOGY SUPPLIES: Performed by: AUDIOLOGIST

## 2019-06-06 PROCEDURE — 1090F PRES/ABSN URINE INCON ASSESS: CPT | Performed by: PHYSICIAN ASSISTANT

## 2019-06-06 PROCEDURE — 4040F PNEUMOC VAC/ADMIN/RCVD: CPT | Performed by: PHYSICIAN ASSISTANT

## 2019-06-06 PROCEDURE — G8598 ASA/ANTIPLAT THER USED: HCPCS | Performed by: PHYSICIAN ASSISTANT

## 2019-06-06 PROCEDURE — 1036F TOBACCO NON-USER: CPT | Performed by: PHYSICIAN ASSISTANT

## 2019-06-06 PROCEDURE — 92567 TYMPANOMETRY: CPT | Performed by: AUDIOLOGIST

## 2019-06-06 PROCEDURE — 99213 OFFICE O/P EST LOW 20 MIN: CPT | Performed by: PHYSICIAN ASSISTANT

## 2019-06-06 PROCEDURE — G8400 PT W/DXA NO RESULTS DOC: HCPCS | Performed by: PHYSICIAN ASSISTANT

## 2019-06-06 PROCEDURE — G8427 DOCREV CUR MEDS BY ELIG CLIN: HCPCS | Performed by: PHYSICIAN ASSISTANT

## 2019-06-06 PROCEDURE — G8417 CALC BMI ABV UP PARAM F/U: HCPCS | Performed by: PHYSICIAN ASSISTANT

## 2019-06-06 RX ORDER — AZELASTINE 1 MG/ML
1 SPRAY, METERED NASAL 2 TIMES DAILY
Qty: 1 BOTTLE | Refills: 2 | Status: SHIPPED | OUTPATIENT
Start: 2019-06-06

## 2019-06-06 ASSESSMENT — ENCOUNTER SYMPTOMS
VOMITING: 0
EYES NEGATIVE: 1
COUGH: 0
RESPIRATORY NEGATIVE: 1
GASTROINTESTINAL NEGATIVE: 1
SHORTNESS OF BREATH: 0
NAUSEA: 0

## 2019-06-06 NOTE — PROGRESS NOTES
Patient seen for hearing aid pickup. No issues noted. Patient taken to [facility] to pay for repair in full. ($150).

## 2019-06-06 NOTE — PROGRESS NOTES
This patient was referred for tympanometric testing by DEVIN Martinez. Tympanometry revealed flat tympanograms, with reduced compliance, bilaterally. The results were reviewed with the patient. Recommendations for follow up will be made pending physician consult.     Cecilio Briseno

## 2019-06-18 PROBLEM — N39.0 UTI (URINARY TRACT INFECTION): Status: ACTIVE | Noted: 2019-06-18

## 2019-07-18 PROBLEM — N39.0 UTI (URINARY TRACT INFECTION): Status: RESOLVED | Noted: 2019-06-18 | Resolved: 2019-07-18

## 2020-01-23 ENCOUNTER — PROCEDURE VISIT (OUTPATIENT)
Dept: AUDIOLOGY | Age: 80
End: 2020-01-23

## 2020-01-23 PROCEDURE — 99999 PR OFFICE/OUTPT VISIT,PROCEDURE ONLY: CPT | Performed by: AUDIOLOGIST

## 2020-02-14 ENCOUNTER — HOSPITAL ENCOUNTER (EMERGENCY)
Age: 80
Discharge: HOME | End: 2020-02-15
Attending: EMERGENCY MEDICINE
Payer: MEDICARE

## 2020-02-14 ENCOUNTER — APPOINTMENT (OUTPATIENT)
Dept: CT IMAGING | Age: 80
End: 2020-02-14
Payer: MEDICARE

## 2020-02-14 LAB
ANION GAP SERPL CALCULATED.3IONS-SCNC: 13 MMOL/L (ref 7–16)
BASOPHILS ABSOLUTE: 0.03 E9/L (ref 0–0.2)
BASOPHILS RELATIVE PERCENT: 0.3 % (ref 0–2)
BUN BLDV-MCNC: 33 MG/DL (ref 8–23)
CALCIUM SERPL-MCNC: 9.1 MG/DL (ref 8.6–10.2)
CHLORIDE BLD-SCNC: 105 MMOL/L (ref 98–107)
CO2: 24 MMOL/L (ref 22–29)
CREAT SERPL-MCNC: 1.6 MG/DL (ref 0.5–1)
EOSINOPHILS ABSOLUTE: 0.19 E9/L (ref 0.05–0.5)
EOSINOPHILS RELATIVE PERCENT: 1.6 % (ref 0–6)
GFR AFRICAN AMERICAN: 37
GFR NON-AFRICAN AMERICAN: 31 ML/MIN/1.73
GLUCOSE BLD-MCNC: 74 MG/DL (ref 74–99)
HCT VFR BLD CALC: 30.2 % (ref 34–48)
HEMOGLOBIN: 9.5 G/DL (ref 11.5–15.5)
IMMATURE GRANULOCYTES #: 0.07 E9/L
IMMATURE GRANULOCYTES %: 0.6 % (ref 0–5)
LYMPHOCYTES ABSOLUTE: 1.58 E9/L (ref 1.5–4)
LYMPHOCYTES RELATIVE PERCENT: 13.4 % (ref 20–42)
MCH RBC QN AUTO: 30.5 PG (ref 26–35)
MCHC RBC AUTO-ENTMCNC: 31.5 % (ref 32–34.5)
MCV RBC AUTO: 97.1 FL (ref 80–99.9)
MONOCYTES ABSOLUTE: 0.81 E9/L (ref 0.1–0.95)
MONOCYTES RELATIVE PERCENT: 6.9 % (ref 2–12)
NEUTROPHILS ABSOLUTE: 9.09 E9/L (ref 1.8–7.3)
NEUTROPHILS RELATIVE PERCENT: 77.2 % (ref 43–80)
PDW BLD-RTO: 14.2 FL (ref 11.5–15)
PLATELET # BLD: 166 E9/L (ref 130–450)
PMV BLD AUTO: 10.1 FL (ref 7–12)
POTASSIUM SERPL-SCNC: 3.7 MMOL/L (ref 3.5–5)
RBC # BLD: 3.11 E12/L (ref 3.5–5.5)
SODIUM BLD-SCNC: 142 MMOL/L (ref 132–146)
WBC # BLD: 11.8 E9/L (ref 4.5–11.5)

## 2020-02-14 PROCEDURE — 74177 CT ABD & PELVIS W/CONTRAST: CPT

## 2020-02-14 PROCEDURE — 6370000000 HC RX 637 (ALT 250 FOR IP): Performed by: EMERGENCY MEDICINE

## 2020-02-14 PROCEDURE — 80048 BASIC METABOLIC PNL TOTAL CA: CPT

## 2020-02-14 PROCEDURE — 85025 COMPLETE CBC W/AUTO DIFF WBC: CPT

## 2020-02-14 PROCEDURE — 2580000003 HC RX 258: Performed by: EMERGENCY MEDICINE

## 2020-02-14 PROCEDURE — 6360000004 HC RX CONTRAST MEDICATION: Performed by: RADIOLOGY

## 2020-02-14 PROCEDURE — 99283 EMERGENCY DEPT VISIT LOW MDM: CPT

## 2020-02-14 RX ORDER — HYDROCODONE BITARTRATE AND ACETAMINOPHEN 7.5; 325 MG/1; MG/1
1 TABLET ORAL ONCE
Status: COMPLETED | OUTPATIENT
Start: 2020-02-14 | End: 2020-02-14

## 2020-02-14 RX ORDER — 0.9 % SODIUM CHLORIDE 0.9 %
500 INTRAVENOUS SOLUTION INTRAVENOUS ONCE
Status: COMPLETED | OUTPATIENT
Start: 2020-02-14 | End: 2020-02-14

## 2020-02-14 RX ADMIN — HYDROCODONE BITARTRATE AND ACETAMINOPHEN 1 TABLET: 7.5; 325 TABLET ORAL at 23:23

## 2020-02-14 RX ADMIN — SODIUM CHLORIDE 500 ML: 9 INJECTION, SOLUTION INTRAVENOUS at 23:15

## 2020-02-14 RX ADMIN — IOPAMIDOL 110 ML: 755 INJECTION, SOLUTION INTRAVENOUS at 22:39

## 2020-02-14 ASSESSMENT — ENCOUNTER SYMPTOMS
BACK PAIN: 1
NAUSEA: 0
ABDOMINAL PAIN: 0
VOMITING: 0
SHORTNESS OF BREATH: 0
CONSTIPATION: 0
DIARRHEA: 0
SORE THROAT: 0

## 2020-02-14 ASSESSMENT — PAIN SCALES - GENERAL: PAINLEVEL_OUTOF10: 10

## 2020-02-14 NOTE — ED PROVIDER NOTES
however patient family at bedside now states that patient's catheter was just changed at [facility] 2 days ago and again this morning by her home health nurse. They report during changing this morning by home health nurse the catheter when being inserted through her suprapubic region \"the catheter tip came out of her vagina\". They called her urologist, Dr Amalia aFy, who told them to present here for concerns of some sort of rupture/fistula    [MF]   2306 Requesting her home pain medication that she typically takes at this time    [MF]   2312 CT scan reviewed, urology paged. [MF]   2336 Discussed case with Dr. Joyce Merlos of urology, he recommends just pulling back Hernadez catheter into place. [MF]   2618 At bedside patient's suprapubic Hernadez catheter balloon was deflated and retracted approximately 10 cm until good urine return and then reinflated. Patient continued to have good urine return. []      ED Course User Index  [MF] Sherita Leonora, DO       ED Course as of Feb 18 0805 Fri Feb 14, 2020 2110 Receiving signout on patient over to go introduce myself and discuss change in the catheter however patient family at bedside now states that patient's catheter was just changed at [facility] 2 days ago and again this morning by her home health nurse. They report during changing this morning by home health nurse the catheter when being inserted through her suprapubic region \"the catheter tip came out of her vagina\". They called her urologist, Dr Amalia Fay, who told them to present here for concerns of some sort of rupture/fistula    [MF]   2306 Requesting her home pain medication that she typically takes at this time    [MF]   2312 CT scan reviewed, urology paged. [MF]   2336 Discussed case with Dr. Joyce Merlos of urology, he recommends just pulling back Hernadez catheter into place.     [MF]   4913 At bedside patient's suprapubic Hernadez catheter balloon was deflated and retracted approximately 10 cm until good urine (Right, 08/2017); and pr cystourethroscopy inj chemodenervation bladder (N/A, 4/24/2018). Social History:  reports that she quit smoking about 37 years ago. She smoked 1.50 packs per day. She has never used smokeless tobacco. She reports that she does not drink alcohol or use drugs. Family History: family history includes Cancer in her mother and sister; Heart Attack in her brother and father; Stroke in her father. The patients home medications have been reviewed. Allergies: Erythromycin; Morphine sulfate;  Other; Penicillins; and Ciprofloxacin    -------------------------------------------------- RESULTS -------------------------------------------------  Labs:  Results for orders placed or performed during the hospital encounter of 02/14/20   CBC Auto Differential   Result Value Ref Range    WBC 11.8 (H) 4.5 - 11.5 E9/L    RBC 3.11 (L) 3.50 - 5.50 E12/L    Hemoglobin 9.5 (L) 11.5 - 15.5 g/dL    Hematocrit 30.2 (L) 34.0 - 48.0 %    MCV 97.1 80.0 - 99.9 fL    MCH 30.5 26.0 - 35.0 pg    MCHC 31.5 (L) 32.0 - 34.5 %    RDW 14.2 11.5 - 15.0 fL    Platelets 497 829 - 446 E9/L    MPV 10.1 7.0 - 12.0 fL    Neutrophils % 77.2 43.0 - 80.0 %    Immature Granulocytes % 0.6 0.0 - 5.0 %    Lymphocytes % 13.4 (L) 20.0 - 42.0 %    Monocytes % 6.9 2.0 - 12.0 %    Eosinophils % 1.6 0.0 - 6.0 %    Basophils % 0.3 0.0 - 2.0 %    Neutrophils Absolute 9.09 (H) 1.80 - 7.30 E9/L    Immature Granulocytes # 0.07 E9/L    Lymphocytes Absolute 1.58 1.50 - 4.00 E9/L    Monocytes Absolute 0.81 0.10 - 0.95 E9/L    Eosinophils Absolute 0.19 0.05 - 0.50 E9/L    Basophils Absolute 0.03 0.00 - 0.20 B3/N   Basic Metabolic Panel   Result Value Ref Range    Sodium 142 132 - 146 mmol/L    Potassium 3.7 3.5 - 5.0 mmol/L    Chloride 105 98 - 107 mmol/L    CO2 24 22 - 29 mmol/L    Anion Gap 13 7 - 16 mmol/L    Glucose 74 74 - 99 mg/dL    BUN 33 (H) 8 - 23 mg/dL    CREATININE 1.6 (H) 0.5 - 1.0 mg/dL    GFR Non-African American 31 >=60 mL/min/1.73 GFR African American 37     Calcium 9.1 8.6 - 10.2 mg/dL       Radiology:  CT ABDOMEN PELVIS W IV CONTRAST Additional Contrast? None   Final Result      No CT finding of an acute process in the abdomen or pelvis. Suprapubic catheter in place, with the tip outside the CT field of   view. No inflammatory changes and no CT findings of bladder rupture. If there is continued clinical concern, a cystogram can be performed. Colonic diverticulosis without diverticulitis. Coronary artery disease.                   ------------------------- NURSING NOTES AND VITALS REVIEWED ---------------------------  Date / Time Roomed:  2/14/2020  5:45 PM  ED Bed Assignment:  ABE/ABE    The nursing notes within the ED encounter and vital signs as below have been reviewed. BP (!) 146/67   Pulse 60   Temp 97.7 °F (36.5 °C) (Oral)   Resp 20   Ht 5' 2\" (1.575 m)   Wt 250 lb (113.4 kg)   SpO2 100%   BMI 45.73 kg/m²   Oxygen Saturation Interpretation: Normal      ------------------------------------------ PROGRESS NOTES ------------------------------------------  8:05 AM  I have spoken with the patient and discussed todays results, in addition to providing specific details for the plan of care and counseling regarding the diagnosis and prognosis. Their questions are answered at this time and they are agreeable with the plan. I discussed at length with them reasons for immediate return here for re evaluation. They will followup with their primary care physician by calling their office tomorrow. --------------------------------- ADDITIONAL PROVIDER NOTES ---------------------------------  At this time the patient is without objective evidence of an acute process requiring hospitalization or inpatient management. They have remained hemodynamically stable throughout their entire ED visit and are stable for discharge with outpatient follow-up.      The plan has been discussed in detail and they are aware of the specific conditions for emergent return, as well as the importance of follow-up. Discharge Medication List as of 2/15/2020 12:21 AM          Diagnosis:  1. Dislodged Hernadez catheter, initial encounter [facility])        Disposition:  Patient's disposition: Discharge to home  Patient's condition is stable.

## 2020-02-15 VITALS
WEIGHT: 250 LBS | RESPIRATION RATE: 20 BRPM | HEART RATE: 60 BPM | HEIGHT: 62 IN | DIASTOLIC BLOOD PRESSURE: 67 MMHG | OXYGEN SATURATION: 100 % | TEMPERATURE: 97.7 F | BODY MASS INDEX: 46.01 KG/M2 | SYSTOLIC BLOOD PRESSURE: 146 MMHG

## 2020-02-15 NOTE — ED NOTES
Pt needed to be transported by [facility]. The only available one was a 7 am - pt will wait in room 4 at ed [facility] - pt was given food and  goun was changed .  Daughter given d/c instructions and they were reviewed - no questions per daughter       Jarad Moscoso RN  02/15/20 1345

## 2020-02-15 NOTE — ED NOTES
Pt to ed for incontinence with suprapubic cath in place.  Just released from [facility] last night     Leslie Valle RN  02/14/20 2327

## 2020-07-15 ENCOUNTER — TELEPHONE (OUTPATIENT)
Dept: AUDIOLOGY | Age: 80
End: 2020-07-15

## 2020-07-15 NOTE — TELEPHONE ENCOUNTER
Needs appointment with ENT for ear pain/right ear. Call daughter (Carrol) to schedule:  [phone].

## 2020-07-20 ENCOUNTER — OFFICE VISIT (OUTPATIENT)
Dept: ENT CLINIC | Age: 80
End: 2020-07-20
Payer: MEDICARE

## 2020-07-20 ENCOUNTER — PROCEDURE VISIT (OUTPATIENT)
Dept: AUDIOLOGY | Age: 80
End: 2020-07-20
Payer: MEDICARE

## 2020-07-20 VITALS — BODY MASS INDEX: 44.16 KG/M2 | WEIGHT: 240 LBS | HEIGHT: 62 IN

## 2020-07-20 PROCEDURE — 99203 OFFICE O/P NEW LOW 30 MIN: CPT | Performed by: NURSE PRACTITIONER

## 2020-07-20 PROCEDURE — 92567 TYMPANOMETRY: CPT | Performed by: AUDIOLOGIST

## 2020-07-20 RX ORDER — FLUTICASONE PROPIONATE 50 MCG
2 SPRAY, SUSPENSION (ML) NASAL DAILY
Qty: 2 BOTTLE | Refills: 1 | Status: SHIPPED
Start: 2020-07-20 | End: 2020-11-12

## 2020-07-20 RX ORDER — DOXYCYCLINE HYCLATE 100 MG
100 TABLET ORAL 2 TIMES DAILY
Qty: 14 TABLET | Refills: 0 | Status: SHIPPED | OUTPATIENT
Start: 2020-07-20 | End: 2020-07-27

## 2020-07-20 ASSESSMENT — ENCOUNTER SYMPTOMS
STRIDOR: 0
ABDOMINAL PAIN: 0
RHINORRHEA: 0
EYES NEGATIVE: 1
RESPIRATORY NEGATIVE: 1
GASTROINTESTINAL NEGATIVE: 1
SHORTNESS OF BREATH: 0

## 2020-07-20 NOTE — PROGRESS NOTES
This patient was referred for audiometric/tympanometric testing by ADRIAN Griffin Cha due to right ear pain. Tympanometry revealed a flat tympanogram, in the left ear, and a shallow tympanogram in the right ear. The results were reviewed with the patient. Recommendations for follow up will be made pending physician consult.     Page Roof

## 2020-08-14 ENCOUNTER — PROCEDURE VISIT (OUTPATIENT)
Dept: AUDIOLOGY | Age: 80
End: 2020-08-14
Payer: MEDICARE

## 2020-08-14 ENCOUNTER — OFFICE VISIT (OUTPATIENT)
Dept: ENT CLINIC | Age: 80
End: 2020-08-14
Payer: MEDICARE

## 2020-08-14 VITALS
HEIGHT: 62 IN | BODY MASS INDEX: 46.01 KG/M2 | WEIGHT: 250 LBS | DIASTOLIC BLOOD PRESSURE: 60 MMHG | RESPIRATION RATE: 20 BRPM | SYSTOLIC BLOOD PRESSURE: 110 MMHG | HEART RATE: 60 BPM

## 2020-08-14 PROCEDURE — 1090F PRES/ABSN URINE INCON ASSESS: CPT | Performed by: OTOLARYNGOLOGY

## 2020-08-14 PROCEDURE — 92567 TYMPANOMETRY: CPT | Performed by: AUDIOLOGIST

## 2020-08-14 PROCEDURE — 99213 OFFICE O/P EST LOW 20 MIN: CPT | Performed by: OTOLARYNGOLOGY

## 2020-08-14 PROCEDURE — G8400 PT W/DXA NO RESULTS DOC: HCPCS | Performed by: OTOLARYNGOLOGY

## 2020-08-14 PROCEDURE — G8427 DOCREV CUR MEDS BY ELIG CLIN: HCPCS | Performed by: OTOLARYNGOLOGY

## 2020-08-14 PROCEDURE — 1123F ACP DISCUSS/DSCN MKR DOCD: CPT | Performed by: OTOLARYNGOLOGY

## 2020-08-14 PROCEDURE — 92557 COMPREHENSIVE HEARING TEST: CPT | Performed by: AUDIOLOGIST

## 2020-08-14 PROCEDURE — 4040F PNEUMOC VAC/ADMIN/RCVD: CPT | Performed by: OTOLARYNGOLOGY

## 2020-08-14 PROCEDURE — 1036F TOBACCO NON-USER: CPT | Performed by: OTOLARYNGOLOGY

## 2020-08-14 PROCEDURE — G8417 CALC BMI ABV UP PARAM F/U: HCPCS | Performed by: OTOLARYNGOLOGY

## 2020-08-18 NOTE — PROGRESS NOTES
This patient was referred for audiometric/tympanometric testing by Dr. Cierra Santiago due to chronic ETD, bilaterally. Patient is being seen for a 6-week ENT follow-up and medical clearance for new hearing aids. Audiometry revealed a moderate-to-severe sensorineural hearing loss, through the frequency range, bilaterally. Reliability was fair. Speech reception thresholds were in good agreement with the pure tone averages, bilaterally. Speech discrimination scores were 88%, right ear and 80%, left ear at [address]. Tympanometry revealed flat tympanograms, bilaterally. Ipsilateral acoustic reflexes were absent, right ear and unable to be tested, left ear at 1000Hz. The results were reviewed with the patient. ENT physician has given medical clearance for new hearing aids. Information will be submitted to her insurance company for hearing aid Prior Authorization.     Poornima Sanz, [address] [place] [address]

## 2020-08-29 ASSESSMENT — ENCOUNTER SYMPTOMS
VOMITING: 0
COUGH: 0
SORE THROAT: 0
RHINORRHEA: 1
SHORTNESS OF BREATH: 0

## 2020-08-29 NOTE — PROGRESS NOTES
[address] [facility] Otolaryngology  Dr. Chinedu Jules. [facility]. Ms.Ed        Patient Name:  Evgeny Odell  :  1940     CHIEF C/O:    Chief Complaint   Patient presents with    Follow-up     hearing loss bilateral ears       HISTORY OBTAINED FROM:  patient    HISTORY OF PRESENT ILLNESS:       Carrol is a 79y.o. year old female, here today for follow up of presents for evaluation of hearing aids. Patient has known history of longstanding hearing loss, a full audiogram was conducted today showing severe sloping to profound bilateral sensorineural hearing loss. No other complaints today of ear pain or drainage, no prior history of ear surgeries, no other complaints of recent or recurrent otitis media with effusions. No other complaints today of nasal congestion fever chills sore throat or difficulty swallowing.       Past Medical History:   Diagnosis Date    Acid reflux     Acute UTI 2012    Anemia 2012    Ankle fracture, bimalleolar, closed 2012    Anxiety     Arthritis     all tj, back legs shoulders, hips    CAD (coronary artery disease)     CAD (coronary artery disease) 2012    Cancer ([address]     skin cancer    CH[facility]congest[facility] failure) [facility]     CKD (chronic kidney disease) stage 3, GFR 30-59 ml/min ([facility]) 2012    COPD (chronic obstructive pulmonary disease) ([facility])     CVA (cerebral infarction) 6/10/2012    Depression     Diabetes mellitus ([facility])     Diverticulitis     DM (diabetes mellitus) ([address] 2012    Femur fracture, right ([address] 2012    H/O exercise stress test     LVEF 70%, no evidence of ischemia    History of Doppler echocardiogram     LVEF 61%, mild concentric LVH, stage I diastolic dysfunction    Hyperlipidemia     Hypertension     Obesity     BREANNA on CPAP 2012    Osteoporosis     Dexascan showing high fracture risk and score for each femoral neck of -2.7    Sleep apnea     Thyroid disease     TIA (transient ischemic attack) 09 Nomral duplex doppler of carotids, normal CT scan of head    Unspecified cerebral artery occlusion with cerebral infarction     Urinary tract infection, site not specified 3/16/2012     Past Surgical History:   Procedure Laterality Date    BACK SURGERY      lumbar fusion 5-7    CARDIAC PACEMAKER PLACEMENT  1/10/09    Dr. Yang Party  03/28/2017    RETROGRADES, PYLEOGRAM, INSERTION SUPRA PUBIC CATHETER    DIAGNOSTIC CARDIAC CATH [facility]    DIAGNOSTIC CARDIAC CATH LAB PROCEDURE  5/95    [facility]    DIAGNOSTIC CARDIAC CATH LAB PROCEDURE  2/23/98    [facility], widely patent coronaries. Normal LV    ECHO COMPL W DOP COLOR FLOW  2/4/2012 EF 55%    stage II diastolic    EYE SURGERY      cataracts     FEMUR FRACTURE SURGERY  1/11    femoral fracture (supracondylar) involving left distal femur    FRACTURE SURGERY      left upper leg plate and screws    HYSTERECTOMY      JOINT REPLACEMENT      x2 right and left    JOINT REPLACEMENT  1/07, right    knee    KNEE SURGERY  2002    left knee by Dr. Myah Barr      fusion 3/4 7/8 /    OTHER SURGICAL HISTORY      mediport 1/24/2012    OTHER SURGICAL HISTORY  2/05/2012    orif rt. distal femur and rt. ankle    PACEMAKER PLACEMENT      AK CYSTOURETHROSCOPY INJ CHEMODENERVATION BLADDER N/A 4/24/2018    CYSTOSCOPY, RETROGRADE, PYELOGRAM BOTOX INJECTION 200 UNITS performed by Tawnya Farooq MD at [address] [place] [address],[facility] PTCA  10/30/95    PTCA of LAD    PTCA  4/14/04    [facility], PTCA of RCA, Taxus drug eluting stent     SHOULDER SURGERY  OCTOBER 2012    LEFT ACROMIOPLASTY,ROTATOR CUFF REPAIR AND ÁNGELA PROCEDURE    SKIN CANCER EXCISION Right 07/2017    right arm.     TUNNELED VENOUS PORT PLACEMENT Right 08/2017    [facility]    UPPER GASTROINTESTINAL ENDOSCOPY      VENA CAVA FILTER PLACEMENT  11/2017    by Dr Samantha Lanier due to DVT       Current Outpatient Medications:     fluticasone (FLONASE) 50 MCG/ACT nasal spray, 2 sprays by Each Nostril route daily, Disp: 2 Bottle, Rfl: 1    azelastine (ASTELIN) 0.1 % nasal spray, 1 spray by Nasal route 2 times daily Use in each nostril as directed, Disp: 1 Bottle, Rfl: 2    predniSONE (DELTASONE) 1 MG tablet, Take 1 mg by mouth daily 3 tabs at noon, Disp: , Rfl:     Cholecalciferol (VITAMIN D3) 2000 units CAPS, Take 1 capsule by mouth daily, Disp: , Rfl:     ondansetron (ZOFRAN) 4 MG tablet, Take 4 mg by mouth every 8 hours as needed for Nausea or Vomiting, Disp: , Rfl:     tobramycin-dexamethasone (TOBRADEX) 0.3-0.1 % ophthalmic suspension, Place 4 drops in the right EAR twice a day for 1 week., Disp: 1 Bottle, Rfl: 0    clopidogrel (PLAVIX) 75 MG tablet, Take 1 tablet by mouth daily, Disp: 30 tablet, Rfl: 0    pantoprazole sodium (PROTONIX) 40 MG PACK packet, Take 40 mg by mouth every morning (before breakfast), Disp: , Rfl:     insulin regular (HUMULIN R;NOVOLIN R) 100 UNIT/ML injection, Inject into the skin See Admin Instructions Sliding scale coverage BID, Disp: , Rfl:     mometasone-formoterol (DULERA) 100-5 MCG/ACT inhaler, Inhale 2 puffs into the lungs 2 times daily, Disp: , Rfl:     budesonide-formoterol (SYMBICORT) 160-4.5 MCG/ACT AERO, Inhale 2 puffs into the lungs 2 times daily, Disp: , Rfl:     zolpidem (AMBIEN) 5 MG tablet, Take 5 mg by mouth nightly as needed for Sleep., Disp: , Rfl:     PRIMIDONE PO, Take 125 mg by mouth 2 times daily , Disp: , Rfl:     ELIQUIS 5 MG TABS tablet, TAKE 2 TABLETS BY MOUTH TWICE DAILY FOR 4 DAYS  THEN TAKE 1 TABLET TWICE DAILY THEREAFTER, Disp: , Rfl: 1    calcitRIOL (ROCALTROL) 0.25 MCG capsule, TAKE ONE CAPSULE BY MOUTH EVERY OTHER DAY, Disp: , Rfl: 0    losartan (COZAAR) 25 MG tablet, TAKE ONE TABLET BY MOUTH EVERY DAY, Disp: , Rfl: 0    omeprazole (PRILOSEC) 40 MG delayed release capsule, TAKE ONE CAPSULE BY MOUTH TWO TIMES A DAY, Disp: , Rfl: 4    spironolactone (ALDACTONE) 25 MG tablet, (LIPITOR) 40 MG tablet, Take 1 tablet by mouth nightly., Disp: 30 tablet, Rfl:     calcium-vitamin D (OSCAL-500) 500-200 MG-UNIT per tablet, Take 1 tablet by mouth daily. , Disp: 30 tablet, Rfl:     ferrous sulfate 325 (65 FE) MG tablet, Take 1 tablet by mouth three times a week., Disp: 30 tablet, Rfl:     furosemide (LASIX) 40 MG tablet, Take 1 tablet by mouth daily. (Patient taking differently: Take 40 mg by mouth 2 times daily ), Disp: 60 tablet, Rfl:     insulin glargine (LANTUS) 100 UNIT/ML injection, Inject 55 Units into the skin every morning. (Patient taking differently: Inject 75 Units into the skin 75 u in the am and 35gpm), Disp: 1 Pen, Rfl:     Omega-3-6-9 CAPS, Take 1,000 mg by mouth daily. , Disp: , Rfl:     OXYGEN, 4 L by Nasal route as needed , Disp: , Rfl:     DAPTOmycin (CUBICIN) 500 MG injection, Infuse 500 mg intravenously every 48 hours, Disp: , Rfl:     dextrose 5 % SOLN 250 mL with vancomycin 1 g SOLR 60 mg/kg/day, Infuse 60 mg/kg/day intravenously every 12 hours, Disp: , Rfl:     traMADol (ULTRAM) 50 MG tablet, TAKE 1 TO 2 TABLET BY MOUTH TWICE A DAY IF NEEDED., Disp: , Rfl: 0    busPIRone (BUSPAR) 10 MG tablet, Take 1 tablet by mouth 2 times daily. (Patient not taking: Reported on 2020), Disp: , Rfl:     folic acid (FOLVITE) 1 MG tablet, Take 1 tablet by mouth daily. (Patient not taking: Reported on 2020), Disp: 30 tablet, Rfl:   Erythromycin; Morphine sulfate;  Other; Penicillins; and Ciprofloxacin  Social History     Tobacco Use    Smoking status: Former Smoker     Packs/day: 1.50     Last attempt to quit: 10/15/1982     Years since quittin.8    Smokeless tobacco: Never Used    Tobacco comment: quit years ago 30   Substance Use Topics    Alcohol use: No     Comment: 5 cups coffee per day and 2 cans coke in afternoon    Drug use: No     Family History   Problem Relation Age of Onset    Cancer Mother     Heart Attack Father     Stroke Father     Cancer Sister  Heart Attack Brother        Review of Systems   Constitutional: Negative for chills and fever. HENT: Positive for postnasal drip and rhinorrhea. Negative for congestion, ear discharge, hearing loss, sore throat and tinnitus. Respiratory: Negative for cough and shortness of breath. Cardiovascular: Negative for chest pain and palpitations. Gastrointestinal: Negative for vomiting. Skin: Negative for rash. Allergic/Immunologic: Negative for environmental allergies. Neurological: Negative for dizziness and headaches. Hematological: Does not bruise/bleed easily. All other systems reviewed and are negative. /60   Pulse 60   Resp 20   Ht 5' 2\" (1.575 m)   Wt 250 lb (113.4 kg)   Breastfeeding No   BMI 45.73 kg/m²   Physical Exam  Vitals signs and nursing note reviewed. Constitutional:       Appearance: She is well-developed. HENT:      Head: Normocephalic. Right Ear: Tympanic membrane, ear canal and external ear normal. Decreased hearing noted. Left Ear: Tympanic membrane, ear canal and external ear normal. Decreased hearing noted. Nose:      Right Turbinates: Not enlarged or swollen. Left Turbinates: Not enlarged or swollen. Mouth/Throat:      Lips: No lesions. Dentition: No dental tenderness, dental caries or gum lesions. Tongue: No lesions. Eyes:      Pupils: Pupils are equal, round, and reactive to light. Neck:      Musculoskeletal: Normal range of motion. Thyroid: No thyromegaly. Trachea: No tracheal deviation. Cardiovascular:      Rate and Rhythm: Normal rate. Pulmonary:      Effort: Pulmonary effort is normal. No respiratory distress. Musculoskeletal: Normal range of motion. Lymphadenopathy:      Cervical: No cervical adenopathy. Skin:     General: Skin is warm. Findings: No erythema. Neurological:      Mental Status: She is alert. Cranial Nerves: No cranial nerve deficit. IMPRESSION/PLAN:  Patient seen and examined, audiogram reviewed, patient is eligible for hearing aid usage, she will be cleared and sent to audiology for hearing aid fittings. Recommendations are for repeat audiogram every 6 months to a year or with any acute exacerbations or changes in hearing. Dr. Lakhwinder Irwin.  Otolaryngology Facial Plastic Surgery  :  [facility]  Associate Clinical Professor:  Jessica Goyal [facility]

## 2020-08-31 ENCOUNTER — TELEPHONE (OUTPATIENT)
Dept: ENT CLINIC | Age: 80
End: 2020-08-31

## 2020-10-01 ENCOUNTER — PROCEDURE VISIT (OUTPATIENT)
Dept: AUDIOLOGY | Age: 80
End: 2020-10-01

## 2020-10-01 PROCEDURE — 99999 PR OFFICE/OUTPT VISIT,PROCEDURE ONLY: CPT | Performed by: AUDIOLOGIST

## 2020-10-13 ENCOUNTER — TELEPHONE (OUTPATIENT)
Dept: AUDIOLOGY | Age: 80
End: 2020-10-13

## 2020-10-19 ENCOUNTER — PROCEDURE VISIT (OUTPATIENT)
Dept: AUDIOLOGY | Age: 80
End: 2020-10-19
Payer: MEDICARE

## 2020-10-19 PROCEDURE — V5160 DISPENSING FEE BINAURAL: HCPCS | Performed by: AUDIOLOGIST

## 2020-10-19 PROCEDURE — V5261 HEARING AID, DIGIT, BIN, BTE: HCPCS | Performed by: AUDIOLOGIST

## 2020-10-19 NOTE — PROGRESS NOTES
Patient seen for hearing aid fitting. Patient and her daughter were instructed in the use and care of the hearing aids and the . Patient is scheduled to return on 11/12/2020 for a hearing aid check. Billing per insurance approval completed at this visit.

## 2021-03-18 LAB
ORGANISM: ABNORMAL
ORGANISM: ABNORMAL
URINE CULTURE, ROUTINE: ABNORMAL
URINE CULTURE, ROUTINE: ABNORMAL

## 2021-05-06 NOTE — PROGRESS NOTES
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 to this. Upon exam she is found to have erythematous bulging right TM, with a middle ear effusion in the left TM. Bilateral ear canals are without erythema or edema. Nasal sinus is without edema, or congestion. She will placed on doxycycline, 1 tablet twice daily for 7 days for acute otitis media, and Flonase, 2 sprays each nostril once daily for eustachian tube dysfunction. She will follow-up in 6 weeks. She is instructed to call with any new or worsening of symptoms prior to her next appointment or failure to improve after 4 weeks.     [place], IAIN, FNP-C  8 [facility], Nose and Throat      The following note was created using voice recognition software and may contain, syntax grammatical or content errors weight  · Discussed patient's concerns, encouraged restoration of quality of life, emotional support. Spouse is very supportive  · We will follow-up in 3 months     Health maintenance screens deferred to Primary care provider. Josafat Bryan MD    This patient has a high complexity chronic care condition   This Visit needed straight High complexity medically necessary decision making and management plans. Time spent in preparing for the visit-review of history, tests done prior to arrival, additional time reviewing clinical data, imaging, outside records and test results as well as time spent in ordering tests, treatments and referring patient for further care was a total of 20 minutes. Additional time-counseling with patient and family members regarding care plan 25  minutes.

## (undated) DEVICE — Z INACTIVE USE 2635503 SOLUTION IRRIG 3000ML ST H2O USP UROMATIC PLAS CONT

## (undated) DEVICE — CAMERA STRYKER 1488 HD GEN

## (undated) DEVICE — BAG DRAINAGE CONTAINER 15 LT FLUID COLLCTN

## (undated) DEVICE — CYSTO PACK: Brand: MEDLINE INDUSTRIES, INC.

## (undated) DEVICE — GARMENT,MEDLINE,DVT,INT,CALF,MED, GEN2: Brand: MEDLINE

## (undated) DEVICE — BAG DRNGE COMB PK

## (undated) DEVICE — BASIC SINGLE BASIN 1-LF: Brand: MEDLINE INDUSTRIES, INC.

## (undated) DEVICE — SOLUTION IV IRRIG WATER 1000ML POUR BRL 2F7114

## (undated) DEVICE — GAUZE,SPONGE,4"X4",16PLY,XRAY,STRL,LF: Brand: MEDLINE

## (undated) DEVICE — GOWN,SIRUS,NONRNF,SETINSLV,XL,20/CS: Brand: MEDLINE

## (undated) DEVICE — GLOVE ORANGE PI 7 1/2   MSG9075

## (undated) DEVICE — CATHETER ETER URETH 30FR BLLN 5CC LTX 2 W F BARDX LUB